# Patient Record
Sex: FEMALE | Race: WHITE
[De-identification: names, ages, dates, MRNs, and addresses within clinical notes are randomized per-mention and may not be internally consistent; named-entity substitution may affect disease eponyms.]

---

## 2018-04-06 ENCOUNTER — HOSPITAL ENCOUNTER (INPATIENT)
Dept: HOSPITAL 47 - EC | Age: 68
LOS: 2 days | Discharge: HOME | DRG: 310 | End: 2018-04-08
Attending: INTERNAL MEDICINE | Admitting: INTERNAL MEDICINE
Payer: MEDICARE

## 2018-04-06 VITALS — BODY MASS INDEX: 39.6 KG/M2

## 2018-04-06 DIAGNOSIS — E66.9: ICD-10-CM

## 2018-04-06 DIAGNOSIS — E11.9: ICD-10-CM

## 2018-04-06 DIAGNOSIS — Z79.899: ICD-10-CM

## 2018-04-06 DIAGNOSIS — I48.0: Primary | ICD-10-CM

## 2018-04-06 DIAGNOSIS — F41.9: ICD-10-CM

## 2018-04-06 DIAGNOSIS — Z79.01: ICD-10-CM

## 2018-04-06 DIAGNOSIS — Z91.040: ICD-10-CM

## 2018-04-06 DIAGNOSIS — I10: ICD-10-CM

## 2018-04-06 DIAGNOSIS — Z73.3: ICD-10-CM

## 2018-04-06 DIAGNOSIS — Z88.1: ICD-10-CM

## 2018-04-06 DIAGNOSIS — L30.4: ICD-10-CM

## 2018-04-06 DIAGNOSIS — Z87.01: ICD-10-CM

## 2018-04-06 DIAGNOSIS — Z90.89: ICD-10-CM

## 2018-04-06 DIAGNOSIS — I45.10: ICD-10-CM

## 2018-04-06 DIAGNOSIS — J45.909: ICD-10-CM

## 2018-04-06 DIAGNOSIS — R01.1: ICD-10-CM

## 2018-04-06 DIAGNOSIS — Z91.013: ICD-10-CM

## 2018-04-06 DIAGNOSIS — Z90.49: ICD-10-CM

## 2018-04-06 DIAGNOSIS — Z88.8: ICD-10-CM

## 2018-04-06 LAB
ALBUMIN SERPL-MCNC: 3.9 G/DL (ref 3.5–5)
ALP SERPL-CCNC: 81 U/L (ref 38–126)
ALT SERPL-CCNC: 36 U/L (ref 9–52)
ANION GAP SERPL CALC-SCNC: 11 MMOL/L
APTT BLD: 23.8 SEC (ref 22–30)
AST SERPL-CCNC: 25 U/L (ref 14–36)
BASOPHILS # BLD AUTO: 0 K/UL (ref 0–0.2)
BASOPHILS NFR BLD AUTO: 0 %
BUN SERPL-SCNC: 21 MG/DL (ref 7–17)
CALCIUM SPEC-MCNC: 10.1 MG/DL (ref 8.4–10.2)
CHLORIDE SERPL-SCNC: 104 MMOL/L (ref 98–107)
CK SERPL-CCNC: 36 U/L (ref 30–135)
CO2 SERPL-SCNC: 30 MMOL/L (ref 22–30)
EOSINOPHIL # BLD AUTO: 0.1 K/UL (ref 0–0.7)
EOSINOPHIL NFR BLD AUTO: 1 %
ERYTHROCYTE [DISTWIDTH] IN BLOOD BY AUTOMATED COUNT: 4.97 M/UL (ref 3.8–5.4)
ERYTHROCYTE [DISTWIDTH] IN BLOOD: 14.9 % (ref 11.5–15.5)
GLUCOSE SERPL-MCNC: 142 MG/DL (ref 74–99)
HCT VFR BLD AUTO: 44.2 % (ref 34–46)
HGB BLD-MCNC: 14.5 GM/DL (ref 11.4–16)
INR PPP: 1 (ref ?–1.2)
LYMPHOCYTES # SPEC AUTO: 1.7 K/UL (ref 1–4.8)
LYMPHOCYTES NFR SPEC AUTO: 15 %
MAGNESIUM SPEC-SCNC: 1.8 MG/DL (ref 1.6–2.3)
MCH RBC QN AUTO: 29.3 PG (ref 25–35)
MCHC RBC AUTO-ENTMCNC: 32.9 G/DL (ref 31–37)
MCV RBC AUTO: 88.9 FL (ref 80–100)
MONOCYTES # BLD AUTO: 0.6 K/UL (ref 0–1)
MONOCYTES NFR BLD AUTO: 5 %
NEUTROPHILS # BLD AUTO: 9.1 K/UL (ref 1.3–7.7)
NEUTROPHILS NFR BLD AUTO: 78 %
PLATELET # BLD AUTO: 276 K/UL (ref 150–450)
POTASSIUM SERPL-SCNC: 4.1 MMOL/L (ref 3.5–5.1)
PROT SERPL-MCNC: 6.7 G/DL (ref 6.3–8.2)
PT BLD: 9.7 SEC (ref 9–12)
SODIUM SERPL-SCNC: 145 MMOL/L (ref 137–145)
TROPONIN I SERPL-MCNC: 0.02 NG/ML (ref 0–0.03)
WBC # BLD AUTO: 11.7 K/UL (ref 3.8–10.6)

## 2018-04-06 PROCEDURE — 96365 THER/PROPH/DIAG IV INF INIT: CPT

## 2018-04-06 PROCEDURE — 83880 ASSAY OF NATRIURETIC PEPTIDE: CPT

## 2018-04-06 PROCEDURE — 94760 N-INVAS EAR/PLS OXIMETRY 1: CPT

## 2018-04-06 PROCEDURE — 85610 PROTHROMBIN TIME: CPT

## 2018-04-06 PROCEDURE — 94640 AIRWAY INHALATION TREATMENT: CPT

## 2018-04-06 PROCEDURE — 36415 COLL VENOUS BLD VENIPUNCTURE: CPT

## 2018-04-06 PROCEDURE — 80061 LIPID PANEL: CPT

## 2018-04-06 PROCEDURE — 82550 ASSAY OF CK (CPK): CPT

## 2018-04-06 PROCEDURE — 85730 THROMBOPLASTIN TIME PARTIAL: CPT

## 2018-04-06 PROCEDURE — 71046 X-RAY EXAM CHEST 2 VIEWS: CPT

## 2018-04-06 PROCEDURE — 84443 ASSAY THYROID STIM HORMONE: CPT

## 2018-04-06 PROCEDURE — 85025 COMPLETE CBC W/AUTO DIFF WBC: CPT

## 2018-04-06 PROCEDURE — 93005 ELECTROCARDIOGRAM TRACING: CPT

## 2018-04-06 PROCEDURE — 84484 ASSAY OF TROPONIN QUANT: CPT

## 2018-04-06 PROCEDURE — 96366 THER/PROPH/DIAG IV INF ADDON: CPT

## 2018-04-06 PROCEDURE — 82553 CREATINE MB FRACTION: CPT

## 2018-04-06 PROCEDURE — 80053 COMPREHEN METABOLIC PANEL: CPT

## 2018-04-06 PROCEDURE — 99291 CRITICAL CARE FIRST HOUR: CPT

## 2018-04-06 PROCEDURE — 83735 ASSAY OF MAGNESIUM: CPT

## 2018-04-06 RX ADMIN — DILTIAZEM HYDROCHLORIDE ONE MLS/HR: 5 INJECTION INTRAVENOUS at 22:34

## 2018-04-06 RX ADMIN — FAMOTIDINE SCH MG: 20 TABLET, FILM COATED ORAL at 21:01

## 2018-04-06 RX ADMIN — BUDESONIDE SCH MG: 0.5 INHALANT ORAL at 20:59

## 2018-04-06 RX ADMIN — NYSTATIN SCH APPLIC: 100000 OINTMENT TOPICAL at 22:27

## 2018-04-06 RX ADMIN — ASPIRIN SCH: 325 TABLET ORAL at 21:02

## 2018-04-06 RX ADMIN — MONTELUKAST SODIUM SCH MG: 10 TABLET, FILM COATED ORAL at 20:57

## 2018-04-06 RX ADMIN — ISODIUM CHLORIDE PRN MG: 0.03 SOLUTION RESPIRATORY (INHALATION) at 21:01

## 2018-04-06 RX ADMIN — PROPAFENONE HYDROCHLORIDE SCH MG: 225 TABLET, FILM COATED ORAL at 20:57

## 2018-04-06 RX ADMIN — TIMOLOL MALEATE SCH DROPS: 5 SOLUTION OPHTHALMIC at 20:56

## 2018-04-06 RX ADMIN — DILTIAZEM HYDROCHLORIDE ONE MLS/HR: 5 INJECTION INTRAVENOUS at 16:42

## 2018-04-06 RX ADMIN — BRIMONIDINE TARTRATE SCH DROPS: 2 SOLUTION/ DROPS OPHTHALMIC at 20:57

## 2018-04-06 RX ADMIN — APIXABAN SCH MG: 5 TABLET, FILM COATED ORAL at 20:57

## 2018-04-06 NOTE — P.HPIM
History of Present Illness





68-year-old female history of atrial fibrillation and history of asthma who 

states she had the onset around 2 PM today of some shortness of breath 

dizziness no palpitations she also had chest pain was 8/10 severity with 

radiation to her gums and left arm.  EMS was finally called she was given 

nitroglycerin with some resolution of the pain down to find a 6/10.  She has no 

recent cough or cold fevers chills sweats.  Patient is found to be in atrial 

fibrillation here patient was started on Cardizem drip and patient denied any 

history of congestive heart failure.  Patient only took half a dose of upper 

part known and she ran out of the medication today she is supposed to see Her 

cardiologist Dr. Mata as an outpatient today.  Patient is already on 

anticoagulation which is being continued percent of troponin is negative.  

Patient denied any fever chills dysuria nausea vomiting.  She attributes most 

of her symptoms to psychosocial stressors at home





Review of Systems


REVIEW OF SYSTEMS: 


CONSTITUTIONAL: No fever, no malaise, no fatigue. 


HEENT: No recent visual problems or hearing problems. Denied any sore throat. 


CARDIOVASCULAR: As mentioned in HPI


PULMONARY: No shortness of breath, no cough, no hemoptysis. 


GASTROINTESTINAL: No diarrhea, no nausea, no vomiting, no abdominal pain. 

Normoactive bowel sounds. 


NEUROLOGICAL: No headaches, no weakness, no numbness. 


HEMATOLOGICAL: Denies any bleeding or petechiae. 


GENITOURINARY: Denies any burning micturition, frequency, or urgency. 


MUSCULOSKELETAL/RHEUMATOLOGICAL: Denies any joint pain, swelling, or any muscle 

pain. 


ENDOCRINE: Denies any polyuria or polydipsia. 





The rest of the 14-point review of systems is negative.











Past Medical History


Past Medical History: Atrial Fibrillation, Hypertension, Pneumonia


History of Any Multi-Drug Resistant Organisms: None Reported


Past Surgical History: Adenoidectomy, Appendectomy, Heart Catheterization, 

Tonsillectomy


Past Psychological History: No Psychological Hx Reported


Smoking Status: Never smoker


Past Alcohol Use History: Occasional


Past Drug Use History: None Reported





Medications and Allergies


 Home Medications











 Medication  Instructions  Recorded  Confirmed  Type


 


ALPRAZolam [Xanax] 0.25 mg PO BID 04/06/18 04/06/18 History


 


Acyclovir 400 mg PO TID PRN 04/06/18 04/06/18 History


 


Albuterol Inhaler [Ventolin Hfa 2 puff INHALATION RT-Q4H PRN 04/06/18 04/06/18 

History





Inhaler]    


 


Albuterol Nebulized [Ventolin 2.5 mg INHALATION RT-TID PRN 04/06/18 04/06/18 

History





Nebulized]    


 


Apixaban [Eliquis] 5 mg PO BID 04/06/18 04/06/18 History


 


Atenolol [Tenormin] 12.5 mg PO DAILY 04/06/18 04/06/18 History


 


Brimonidine Tartrate/Timolol 1 drop BOTH EYES Q12H 04/06/18 04/06/18 History





[Combigan 0.2%-0.5% Eye Drops]    


 


Budesonide [Pulmicort] 0.5 mg INHALATION RT-BID 04/06/18 04/06/18 History


 


Docusate [Colace] 100 mg PO DAILY PRN 04/06/18 04/06/18 History


 


Famotidine [Pepcid] 20 mg PO BID 04/06/18 04/06/18 History


 


Fish Oil/Dha/Epa [Fish Oil 1,200 1 cap PO DAILY 04/06/18 04/06/18 History





mg Fish Oil]    


 


Hydrochlorothiazide 12.5 mg PO DAILY 04/06/18 04/06/18 History


 


Lisinopril 40 mg PO DAILY 04/06/18 04/06/18 History


 


Montelukast [Singulair] 10 mg PO HS 04/06/18 04/06/18 History


 


Nefazodone HCl 100 - 200 mg PO HS 04/06/18 04/06/18 History


 


Nystatin 100,000 Unit/gm Oint 1 applic TOPICAL BID 04/06/18 04/06/18 History





[Mycostatin Oint]    


 


Propafenone [Rythmol] 225 mg PO Q8H 04/06/18 04/06/18 History


 


Super B Complex Max 1 tab PO DAILY 04/06/18 04/06/18 History


 


Ubidecarenone [Co Q-10] 100 mg PO DAILY 04/06/18 04/06/18 History


 


Vits A,C,E/Lutein/Minerals 1 tab PO DAILY 04/06/18 04/06/18 History





[Ocuvite with Lutein Tablet]    


 


guaiFENesin [Mucinex] 600 mg PO BID 04/06/18 04/06/18 History











 Allergies











Allergy/AdvReac Type Severity Reaction Status Date / Time


 


azithromycin [From Zithromax] Allergy  Unknown Verified 04/06/18 17:05


 


ciprofloxacin Allergy  Unknown Verified 04/06/18 17:04


 


fluoxetine Allergy  Unknown Verified 04/06/18 17:05


 


latex Allergy  Dyspnea Verified 04/06/18 17:04


 


Latex, Natural Rubber Allergy  Dyspnea Verified 04/06/18 17:04


 


paroxetine [From Paxil] Allergy  Unknown Verified 04/06/18 17:05


 


shellfish derived [Shellfish] Allergy  Unknown Verified 04/06/18 17:05














Physical Exam


Vitals: 


 Vital Signs











  Temp Pulse Pulse Resp BP Pulse Ox


 


 04/06/18 19:33  97.8 F  75   18  106/74  100


 


 04/06/18 19:00   75   18  106/74  100


 


 04/06/18 18:57   82   18  101/53  97


 


 04/06/18 18:10   116 H   18  128/87  97


 


 04/06/18 17:42   136 H   18  140/60  98


 


 04/06/18 16:23    140 H   


 


 04/06/18 15:33  97.8 F  158 H   18  170/95  98








 Intake and Output











 04/06/18 04/06/18 04/06/18





 06:59 14:59 22:59


 


Other:   


 


  Weight   97.522 kg











PHYSICAL EXAMINATION: 





GENERAL: The patient is alert and oriented x3, not in any acute distress. Well 

developed, well nourished. 


HEENT: Pupils are round and equally reacting to light. EOMI. No scleral 

icterus. No conjunctival pallor. Normocephalic, atraumatic. No pharyngeal 

erythema. No thyromegaly. 


CARDIOVASCULAR: S1 and S2 present. No murmurs, rubs, or gallops.  Irregularly 

irregular rhythm.


PULMONARY: Chest is clear to auscultation, no wheezing or crackles. 


ABDOMEN: Soft, nontender, nondistended, normoactive bowel sounds. No palpable 

organomegaly. 


MUSCULOSKELETAL: No joint swelling or deformity.


EXTREMITIES: No cyanosis, clubbing, or pedal edema. 


NEUROLOGICAL: Gross neurological examination did not reveal any focal deficits. 


SKIN: No rashes. 











Results


CBC & Chem 7: 


 04/06/18 16:15





 04/06/18 16:15


Labs: 


 Abnormal Lab Results - Last 24 Hours (Table)











  04/06/18 04/06/18 04/06/18 Range/Units





  16:15 16:15 16:15 


 


WBC   11.7 H   (3.8-10.6)  k/uL


 


Neutrophils #   9.1 H   (1.3-7.7)  k/uL


 


BUN    21 H  (7-17)  mg/dL


 


Glucose    142 H  (74-99)  mg/dL


 


CK-MB (CK-2)  3.0 H*    (0.0-2.4)  ng/mL














Assessment and Plan


Plan: 


-Chest pain appears to be secondary to atrial fibrillation reveal a valid the 

patient 2 more sets of troponins will be obtained 


-atrial fibrillation with rapid and regular rate secondary to missing her 

medications.  Patient will be continued on Cardizem and off Cardizem and 

patient will be restarted back on her propafenone known and anti-correlation 

will be continued.  She appears to have had a recent echocardiogram at [facility] results of which will be obtained tomorrow.


-Hypertension


-Anxiety disorder


-Discussed esophageal reflux disease


-Asthma without any acute exacerbation presently

## 2018-04-06 NOTE — ED
Chest Pain HPI





- General


Chief Complaint: Chest Pain


Stated Complaint: Chest pain


Time Seen by Provider: 04/06/18 15:30


Source: patient, RN notes reviewed


Mode of arrival: EMS


Limitations: no limitations





- History of Present Illness


Initial Comments: 





This is a 68-year-old female history of atrial fibrillation and history of 

asthma who states she had the onset around 2 PM today of some shortness of 

breath dizziness no palpitations she also had chest pain was 8/10 severity with 

radiation to her gums and left arm.  EMS was finally called she was given 

nitroglycerin with some resolution of the pain down to find a 6/10.  She has no 

recent cough or cold fevers chills sweats.


MD Complaint: chest pain, other





- Related Data


 Home Medications











 Medication  Instructions  Recorded  Confirmed


 


ALPRAZolam [Xanax] 0.25 mg PO BID 04/06/18 04/06/18


 


Acyclovir 400 mg PO TID PRN 04/06/18 04/06/18


 


Albuterol Inhaler [Ventolin Hfa 2 puff INHALATION RT-Q4H PRN 04/06/18 04/06/18





Inhaler]   


 


Albuterol Nebulized [Ventolin 2.5 mg INHALATION RT-TID PRN 04/06/18 04/06/18





Nebulized]   


 


Apixaban [Eliquis] 5 mg PO BID 04/06/18 04/06/18


 


Atenolol [Tenormin] 12.5 mg PO DAILY 04/06/18 04/06/18


 


Brimonidine Tartrate/Timolol 1 drop BOTH EYES Q12H 04/06/18 04/06/18





[Combigan 0.2%-0.5% Eye Drops]   


 


Budesonide [Pulmicort] 0.5 mg INHALATION RT-BID 04/06/18 04/06/18


 


Docusate [Colace] 100 mg PO DAILY PRN 04/06/18 04/06/18


 


Famotidine [Pepcid] 20 mg PO BID 04/06/18 04/06/18


 


Fish Oil/Dha/Epa [Fish Oil 1,200 1 cap PO DAILY 04/06/18 04/06/18





mg Fish Oil]   


 


Hydrochlorothiazide 12.5 mg PO DAILY 04/06/18 04/06/18


 


Lisinopril 40 mg PO DAILY 04/06/18 04/06/18


 


Montelukast [Singulair] 10 mg PO HS 04/06/18 04/06/18


 


Nefazodone HCl 100 - 200 mg PO HS 04/06/18 04/06/18


 


Nystatin 100,000 Unit/gm Oint 1 applic TOPICAL BID 04/06/18 04/06/18





[Mycostatin Oint]   


 


Propafenone [Rythmol] 225 mg PO Q8H 04/06/18 04/06/18


 


Super B Complex Max 1 tab PO DAILY 04/06/18 04/06/18


 


Ubidecarenone [Co Q-10] 100 mg PO DAILY 04/06/18 04/06/18


 


Vits A,C,E/Lutein/Minerals 1 tab PO DAILY 04/06/18 04/06/18





[Ocuvite with Lutein Tablet]   


 


guaiFENesin [Mucinex] 600 mg PO BID 04/06/18 04/06/18











 Allergies











Allergy/AdvReac Type Severity Reaction Status Date / Time


 


azithromycin [From Zithromax] Allergy  Unknown Verified 04/06/18 17:05


 


ciprofloxacin Allergy  Unknown Verified 04/06/18 17:04


 


fluoxetine Allergy  Unknown Verified 04/06/18 17:05


 


latex Allergy  Dyspnea Verified 04/06/18 17:04


 


Latex, Natural Rubber Allergy  Dyspnea Verified 04/06/18 17:04


 


paroxetine [From Paxil] Allergy  Unknown Verified 04/06/18 17:05


 


shellfish derived [Shellfish] Allergy  Unknown Verified 04/06/18 17:05














Review of Systems


ROS Statement: 


Those systems with pertinent positive or pertinent negative responses have been 

documented in the HPI.





ROS Other: All systems not noted in ROS Statement are negative.





EKG Findings





- EKG Results:


EKG: interpreted by ER[facility] (Atrial fibrillation with a rapid ventricular response 

rate of 136 QT/QTC of 334/5 with 2 nonspecific interventricular conduction 

block nonspecific T-wave configuration.  This is changed from an EKG dated 03/17 /2013 which at that time showed a normal sinus rhythm.)





Past Medical History


Past Medical History: Atrial Fibrillation, Hypertension, Pneumonia


History of Any Multi-Drug Resistant Organisms: None Reported


Past Surgical History: Adenoidectomy, Appendectomy, Heart Catheterization, 

Tonsillectomy


Past Psychological History: No Psychological Hx Reported


Smoking Status: Never smoker


Past Alcohol Use History: Occasional


Past Drug Use History: None Reported





General Exam





- General Exam Comments


Initial Comments: 





This is a well-developed well-nourished awake alert oriented 3 female


Limitations: no limitations


General appearance: alert, anxious


Head exam: Present: atraumatic, normocephalic, normal inspection


Eye exam: Present: normal appearance, PERRL, EOMI.  Absent: scleral icterus, 

conjunctival injection, periorbital swelling


ENT exam: Present: normal exam, mucous membranes moist


Neck exam: Present: normal inspection.  Absent: tenderness, meningismus, 

lymphadenopathy


Respiratory exam: Present: normal lung sounds bilaterally.  Absent: respiratory 

distress, wheezes, rales, rhonchi, stridor


Cardiovascular Exam: Present: tachycardia, irregular rhythm.  Absent: systolic 

murmur, diastolic murmur, rubs, gallop, clicks


GI/Abdominal exam: Present: soft, normal bowel sounds.  Absent: distended, 

tenderness, guarding, rebound, rigid


Extremities exam: Present: normal inspection, full ROM, normal capillary 

refill.  Absent: tenderness, pedal edema, joint swelling, calf tenderness


Back exam: Present: normal inspection


Neurological exam: Present: alert, oriented X3, CN II-XII intact


Psychiatric exam: Present: normal affect, normal mood


Skin exam: Present: warm, dry, intact, normal color.  Absent: rash





Course


 Vital Signs











  04/06/18 04/06/18 04/06/18





  15:33 16:23 17:42


 


Temperature 97.8 F  


 


Pulse Rate 158 H  136 H


 


Pulse Rate [  140 H 





Cardiac Monitor   





]   


 


Respiratory 18  18





Rate   


 


Blood Pressure 170/95  140/60


 


O2 Sat by Pulse 98  98





Oximetry   














  04/06/18





  18:10


 


Temperature 


 


Pulse Rate 116 H


 


Pulse Rate [ 





Cardiac Monitor 





] 


 


Respiratory 18





Rate 


 


Blood Pressure 128/87


 


O2 Sat by Pulse 97





Oximetry 














- Reevaluation(s)


Reevaluation #1: 





04/06/18 18:12


Some improvement in the heart rate but is still vacillating between the 110s to 

140s no further chest pain at this time.





Chest Pain MDM





- MDM





Imaging shows no acute findings I discuss [employer] the patient she had atrial 

fibrillation with rapid ventricular response she'll be admitted with 

consultation by cardiology.  She does see Dr. Mata from cardiology





Critical Care Time


Critical Care Time: Yes


Critical Care Time: 





31 minutes critical care time which includes initial presentation with history 

physical labs x-rays reevaluation patient responsive therapy.  Discussion with 

the patient regarding findings discussed with the main physician admission 

orders and documentation of the above





Disposition


Clinical Impression: 


 Rapid atrial fibrillation, Atypical chest pain





Disposition: ADMITTED AS IP TO THIS HOSP


Condition: Stable


Referrals: 


None,Stated [Primary Care Provider] - 1-2 days

## 2018-04-06 NOTE — XR
EXAMINATION TYPE: XR chest 2V

 

DATE OF EXAM: 4/6/2018

 

COMPARISON: 3/17/2013

 

HISTORY: 68-year-old female with chest pain

 

TECHNIQUE:  AP and lateral views

 

FINDINGS:  

Heart upper limits of normal in size. Aorta within normal limits. Mild diffuse interstitial prominenc
e peribronchial cuffing. No consolidation or pleural effusion. Limitations due to portable technique 
and large patient body habitus.

 

 

IMPRESSION:  

Correlate for bronchitis or asthma. No focal infiltrate.

## 2018-04-07 LAB
ALBUMIN SERPL-MCNC: 3.2 G/DL (ref 3.5–5)
ALP SERPL-CCNC: 61 U/L (ref 38–126)
ALT SERPL-CCNC: 32 U/L (ref 9–52)
ANION GAP SERPL CALC-SCNC: 11 MMOL/L
AST SERPL-CCNC: 24 U/L (ref 14–36)
BUN SERPL-SCNC: 21 MG/DL (ref 7–17)
CALCIUM SPEC-MCNC: 9.5 MG/DL (ref 8.4–10.2)
CHLORIDE SERPL-SCNC: 106 MMOL/L (ref 98–107)
CHOLEST SERPL-MCNC: 160 MG/DL (ref ?–200)
CO2 SERPL-SCNC: 27 MMOL/L (ref 22–30)
GLUCOSE SERPL-MCNC: 122 MG/DL (ref 74–99)
HDLC SERPL-MCNC: 52 MG/DL (ref 40–60)
LDLC SERPL CALC-MCNC: 80 MG/DL (ref 0–99)
POTASSIUM SERPL-SCNC: 4.5 MMOL/L (ref 3.5–5.1)
PROT SERPL-MCNC: 5.7 G/DL (ref 6.3–8.2)
SODIUM SERPL-SCNC: 144 MMOL/L (ref 137–145)
TRIGL SERPL-MCNC: 141 MG/DL (ref ?–150)

## 2018-04-07 RX ADMIN — NYSTATIN SCH APPLIC: 100000 OINTMENT TOPICAL at 08:19

## 2018-04-07 RX ADMIN — BRIMONIDINE TARTRATE SCH DROPS: 2 SOLUTION/ DROPS OPHTHALMIC at 20:14

## 2018-04-07 RX ADMIN — PROPAFENONE HYDROCHLORIDE SCH MG: 225 TABLET, FILM COATED ORAL at 20:15

## 2018-04-07 RX ADMIN — NYSTATIN SCH APPLIC: 100000 OINTMENT TOPICAL at 20:25

## 2018-04-07 RX ADMIN — PROPAFENONE HYDROCHLORIDE SCH MG: 225 TABLET, FILM COATED ORAL at 04:22

## 2018-04-07 RX ADMIN — PROPAFENONE HYDROCHLORIDE SCH MG: 225 TABLET, FILM COATED ORAL at 12:49

## 2018-04-07 RX ADMIN — TIMOLOL MALEATE SCH DROPS: 5 SOLUTION OPHTHALMIC at 08:18

## 2018-04-07 RX ADMIN — ISODIUM CHLORIDE PRN MG: 0.03 SOLUTION RESPIRATORY (INHALATION) at 20:55

## 2018-04-07 RX ADMIN — LISINOPRIL SCH MG: 20 TABLET ORAL at 08:17

## 2018-04-07 RX ADMIN — Medication SCH EACH: at 12:49

## 2018-04-07 RX ADMIN — APIXABAN SCH MG: 5 TABLET, FILM COATED ORAL at 08:16

## 2018-04-07 RX ADMIN — MONTELUKAST SODIUM SCH MG: 10 TABLET, FILM COATED ORAL at 20:16

## 2018-04-07 RX ADMIN — I-VITE, TAB 1000-60-2MG (60/BT) SCH EACH: TAB at 12:49

## 2018-04-07 RX ADMIN — BUDESONIDE SCH MG: 0.5 INHALANT ORAL at 11:10

## 2018-04-07 RX ADMIN — APIXABAN SCH MG: 5 TABLET, FILM COATED ORAL at 20:15

## 2018-04-07 RX ADMIN — TIMOLOL MALEATE SCH DROPS: 5 SOLUTION OPHTHALMIC at 20:15

## 2018-04-07 RX ADMIN — BRIMONIDINE TARTRATE SCH DROPS: 2 SOLUTION/ DROPS OPHTHALMIC at 08:18

## 2018-04-07 RX ADMIN — ISODIUM CHLORIDE PRN MG: 0.03 SOLUTION RESPIRATORY (INHALATION) at 16:45

## 2018-04-07 RX ADMIN — BUDESONIDE SCH MG: 0.5 INHALANT ORAL at 20:52

## 2018-04-07 RX ADMIN — FAMOTIDINE SCH MG: 20 TABLET, FILM COATED ORAL at 08:16

## 2018-04-07 RX ADMIN — ASPIRIN SCH: 325 TABLET ORAL at 20:17

## 2018-04-07 RX ADMIN — FAMOTIDINE SCH MG: 20 TABLET, FILM COATED ORAL at 20:15

## 2018-04-07 RX ADMIN — ATENOLOL SCH MG: 25 TABLET ORAL at 08:17

## 2018-04-07 NOTE — P.CRDCN
History of Present Illness


Consult date: 04/07/18


Chief complaint: Chest discomfort


History of present illness: 





This is a pleasant 68-year-old  female patient who sees Dr. Mata as 

an outpatient on a regular basis with a past medical history significant for 

paroxysmal atrial fibrillation, obesity, and hypertension, presented to the 

hospital complaining of chest discomfort.  She just was discharged from the 

hospital last week after she was admitted with a pneumonia.  She was in her 

usual state of health when she took her updraft treatment for asthma and after 

that she felt the heart was racing up as well as she developed chest 

discomfort.  She does not recall which ones came in first.  She decided to come 

to the emergency room.  She states that she was running out of her medications 

for about 24 hours.  In the ER she was found to be in A. fib with RVR and 

subsequently converted to normal sinus mechanism.





The initial EKG showed A. fib with RVR and subsequent EKG showed sinus rhythm 

with RBBB and nonspecific changes.  The first set of cardiac enzyme came in to 

be slightly abnormal.





Currently the patient is in normal sinus mechanism and she is asymptomatic.  

She was restarted back on the beta blocker as well as Rythmol.  She is also on 

oral anticoagulation.





Past Medical History


Past Medical History: Atrial Fibrillation, Hypertension, Pneumonia


History of Any Multi-Drug Resistant Organisms: None Reported


Past Surgical History: Adenoidectomy, Appendectomy, Heart Catheterization, 

Tonsillectomy


Past Psychological History: No Psychological Hx Reported


Smoking Status: Never smoker


Past Alcohol Use History: Occasional


Past Drug Use History: None Reported





Medications and Allergies


 Home Medications











 Medication  Instructions  Recorded  Confirmed  Type


 


ALPRAZolam [Xanax] 0.25 mg PO BID 04/06/18 04/06/18 History


 


Acyclovir 400 mg PO TID PRN 04/06/18 04/06/18 History


 


Albuterol Inhaler [Ventolin Hfa 2 puff INHALATION RT-Q4H PRN 04/06/18 04/06/18 

History





Inhaler]    


 


Albuterol Nebulized [Ventolin 2.5 mg INHALATION RT-TID PRN 04/06/18 04/06/18 

History





Nebulized]    


 


Apixaban [Eliquis] 5 mg PO BID 04/06/18 04/06/18 History


 


Atenolol [Tenormin] 12.5 mg PO DAILY 04/06/18 04/06/18 History


 


Brimonidine Tartrate/Timolol 1 drop BOTH EYES Q12H 04/06/18 04/06/18 History





[Combigan 0.2%-0.5% Eye Drops]    


 


Budesonide [Pulmicort] 0.5 mg INHALATION RT-BID 04/06/18 04/06/18 History


 


Docusate [Colace] 100 mg PO DAILY PRN 04/06/18 04/06/18 History


 


Famotidine [Pepcid] 20 mg PO BID 04/06/18 04/06/18 History


 


Fish Oil/Dha/Epa [Fish Oil 1,200 1 cap PO DAILY 04/06/18 04/06/18 History





mg Fish Oil]    


 


Hydrochlorothiazide 12.5 mg PO DAILY 04/06/18 04/06/18 History


 


Lisinopril 40 mg PO DAILY 04/06/18 04/06/18 History


 


Montelukast [Singulair] 10 mg PO HS 04/06/18 04/06/18 History


 


Nefazodone HCl 100 - 200 mg PO HS 04/06/18 04/06/18 History


 


Nystatin 100,000 Unit/gm Oint 1 applic TOPICAL BID 04/06/18 04/06/18 History





[Mycostatin Oint]    


 


Propafenone [Rythmol] 225 mg PO Q8H 04/06/18 04/06/18 History


 


Super B Complex Max 1 tab PO DAILY 04/06/18 04/06/18 History


 


Ubidecarenone [Co Q-10] 100 mg PO DAILY 04/06/18 04/06/18 History


 


Vits A,C,E/Lutein/Minerals 1 tab PO DAILY 04/06/18 04/06/18 History





[Ocuvite with Lutein Tablet]    


 


guaiFENesin [Mucinex] 600 mg PO BID 04/06/18 04/06/18 History











 Allergies











Allergy/AdvReac Type Severity Reaction Status Date / Time


 


azithromycin [From Zithromax] Allergy  Unknown Verified 04/06/18 17:05


 


ciprofloxacin Allergy  Unknown Verified 04/06/18 17:04


 


fluoxetine Allergy  Unknown Verified 04/06/18 17:05


 


latex Allergy  Dyspnea Verified 04/06/18 17:04


 


Latex, Natural Rubber Allergy  Dyspnea Verified 04/06/18 17:04


 


paroxetine [From Paxil] Allergy  Unknown Verified 04/06/18 17:05


 


shellfish derived [Shellfish] Allergy  Unknown Verified 04/06/18 17:05














Physical Exam


Vitals: 


 Vital Signs











  Temp Pulse Pulse Resp BP BP Pulse Ox


 


 04/07/18 04:00  96.9 F L   60  18   105/50  98


 


 04/07/18 00:00  97.5 F L   69  20   105/74  99


 


 04/06/18 21:16   72     


 


 04/06/18 21:01   69     


 


 04/06/18 19:33  97.8 F  75   18  106/74   100


 


 04/06/18 19:00   75   18  106/74   100


 


 04/06/18 18:57   82   18  101/53   97


 


 04/06/18 18:45  96.2 F L   71  18   110/51  95


 


 04/06/18 18:10   116 H   18  128/87   97


 


 04/06/18 17:42   136 H   18  140/60   98


 


 04/06/18 16:23    140 H    


 


 04/06/18 15:33  97.8 F  158 H   18  170/95   98








 Intake and Output











 04/06/18 04/07/18 04/07/18





 22:59 06:59 14:59


 


Intake Total 29.333  


 


Balance 29.333  


 


Intake:   


 


  Intake, IV Titration 29.333  





  Amount   


 


    Diltiazem 50 mg In Sodium 29.333  





    Chloride 0.9% 40 ml @ 5   





    MG/HR 5 mls/hr IV .Q10H   





    ONE Rx#:[id number]   


 


Other:   


 


  # Voids 0 1 


 


  Weight 101.7 kg 101.7 kg 














- Constitutional


General appearance: no acute distress





- Respiratory


Respiratory: bilateral: CTA





- Cardiovascular


Rhythm: regular


Heart sounds: normal: S1, S2





Results





 04/06/18 16:15





 04/06/18 16:15


 Cardiac Enzymes











  04/06/18 04/06/18 04/06/18 Range/Units





  16:15 16:15 22:22 


 


AST   25   (14-36)  U/L


 


CK-MB (CK-2)  3.0 H*    (0.0-2.4)  ng/mL


 


Troponin I  0.020   0.269 H*  (0.000-0.034)  ng/mL














  04/07/18 Range/Units





  03:37 


 


AST   (14-36)  U/L


 


CK-MB (CK-2)   (0.0-2.4)  ng/mL


 


Troponin I  0.337 H*  (0.000-0.034)  ng/mL








 Coagulation











  04/06/18 Range/Units





  16:15 


 


PT  9.7  (9.0-12.0)  sec


 


APTT  23.8  (22.0-30.0)  sec








 CBC











  04/06/18 Range/Units





  16:15 


 


WBC  11.7 H  (3.8-10.6)  k/uL


 


RBC  4.97  (3.80-5.40)  m/uL


 


Hgb  14.5  (11.4-16.0)  gm/dL


 


Hct  44.2  (34.0-46.0)  %


 


Plt Count  276  (150-450)  k/uL








 Comprehensive Metabolic Panel











  04/06/18 Range/Units





  16:15 


 


Sodium  145  (137-145)  mmol/L


 


Potassium  4.1  (3.5-5.1)  mmol/L


 


Chloride  104  ()  mmol/L


 


Carbon Dioxide  30  (22-30)  mmol/L


 


BUN  21 H  (7-17)  mg/dL


 


Creatinine  0.80  (0.52-1.04)  mg/dL


 


Glucose  142 H  (74-99)  mg/dL


 


Calcium  10.1  (8.4-10.2)  mg/dL


 


AST  25  (14-36)  U/L


 


ALT  36  (9-52)  U/L


 


Alkaline Phosphatase  81  ()  U/L


 


Total Protein  6.7  (6.3-8.2)  g/dL


 


Albumin  3.9  (3.5-5.0)  g/dL








 Current Medications











Generic Name Dose Route Start Last Admin





  Trade Name Freq  PRN Reason Stop Dose Admin


 


Acyclovir  400 mg  04/06/18 18:18  04/07/18 01:37





  Zovirax  PO   400 mg





  TID PRN   Administration





  Skin Irritation   


 


Albuterol Sulfate  2.5 mg  04/06/18 18:18  04/06/18 21:01





  Ventolin Nebulized  INHALATION   2.5 mg





  RT-Q4H PRN   Administration





  Shortness Of Breath   


 


Albuterol Sulfate  2.5 mg  04/06/18 18:18  





  Ventolin Nebulized  INHALATION   





  RT-TID PRN   





  Shortness Of Breath   


 


Alprazolam  0.25 mg  04/06/18 21:00  04/07/18 08:13





  Xanax  PO   Not Given





  BID LENIN   


 


Apixaban  5 mg  04/06/18 21:00  04/07/18 08:16





  Eliquis  PO   5 mg





  BID LENIN   Administration


 


Atenolol  12.5 mg  04/07/18 09:00  04/07/18 08:17





  Tenormin  PO   12.5 mg





  DAILY LENIN   Administration


 


Brimonidine Tartrate  1 drops  04/06/18 21:00  04/07/18 08:18





  Alphagan P 0.2% Ophth Soln  BOTH EYES   1 drops





  Q12H LENIN   Administration


 


Budesonide  0.5 mg  04/06/18 20:00  04/06/18 20:59





  Pulmicort  INHALATION   0.5 mg





  RT-BID LENIN   Administration


 


Docusate Sodium  100 mg  04/06/18 18:18  





  Colace  PO   





  DAILY PRN   





  Constipation   


 


Famotidine  20 mg  04/06/18 21:00  04/07/18 08:16





  Pepcid  PO   20 mg





  BID [facility]   Administration


 


Guaifenesin  600 mg  04/06/18 21:00  04/07/18 08:16





  Mucinex  PO   600 mg





  BID [facility]   Administration


 


Lisinopril  20 mg  04/07/18 09:00  04/07/18 08:17





  Zestril  PO   20 mg





  DAILY [facility]   Administration


 


Montelukast Sodium  10 mg  04/06/18 21:00  04/06/18 20:57





  Singulair  PO   10 mg





  [facility]   Administration


 


Multivitamins/Minerals  1 each  04/07/18 12:00  





  Ivite  PO   





  DAILY@1200 [facility]   


 


Naloxone HCl  0.2 mg  04/06/18 18:16  





  Narcan  IV   





  Q2M PRN   





  Opioid Reversal   


 


Nefazodone Hcl 200  200 mg  04/06/18 21:00  04/06/18 21:02





  Mg  PO   Not Given





  [facility]   


 


Nystatin  1 applic  04/06/18 21:00  04/07/18 08:19





  Mycostatin Oint  TOPICAL   1 applic





  BID [facility]   Administration


 


Propafenone HCl  225 mg  04/06/18 20:00  04/07/18 04:22





  Rythmol  PO   225 mg





  Q8H [facility]   Administration


 


Timolol Maleate  1 drops  04/06/18 21:00  04/07/18 08:18





  Timoptic  BOTH EYES   1 drops





  Q12H [facility]   Administration


 


Vitamin B Complex/Vit C/Vit E/Zinc  1 each  04/07/18 12:00  





  Z-Bec  PO   





  DAILY@1200 LENIN   








 Intake and Output











 04/06/18 04/07/18 04/07/18





 22:59 06:59 14:59


 


Intake Total 29.333  


 


Balance 29.333  


 


Intake:   


 


  Intake, IV Titration 29.333  





  Amount   


 


    Diltiazem 50 mg In Sodium 29.333  





    Chloride 0.9% 40 ml @ 5   





    MG/HR 5 mls/hr IV .Q10H   





    ONE Rx#:[id number]   


 


Other:   


 


  # Voids 0 1 


 


  Weight 101.7 kg 101.7 kg 








 





 04/06/18 16:15 





 04/06/18 16:15 











Assessment and Plan


Assessment: 





Assessment


#1 A. fib with RVR and the patient converted to normal sinus mechanism


#2 known history of paroxysmal atrial fibrillation on antiarrhythmic medication


#3 mildly abnormal cardiac enzymes could be secondary to the A. fib with RVR.  

Severe underlying CAD to be ruled out


#4 hypertension


#5 obesity





Plan


#1 continue the current medical treatment which include the beta blocker as 

well as Rythmol as well as Eliquis.


#2 obtain serial cardiac enzymes to about any acute coronary event


#3 the patient has been maintaining normal sinus mechanism 


#4 follow-up on the echocardiogram


#5 if she developed any more chest discomfort she need to be ruled out for 

severe COPD


#6 follow-up with the patient.





Thank you for allowing us participate in her care

## 2018-04-07 NOTE — P.PN
Subjective


Patient is presently rate controlled and rhythm controlled as well considering 

her chest pain and elevated troponins cardiology is a monitoring 1 more day 

patient did admitted for atrial fibrillation with rapid ventricular rate with 

some chest pressure like sensation.  Patient has issues with psychosocial 

stressors and anxiety.





Constitutional: Denied any fatigue denied any fever.


Cardio vascular: denied any chest pain, palpitations


Gastrointestinal denied any nausea vomiting


Pulmonary: Denied any shortness of breath cough


Neurologic denied any new focal deficits








Objective





- Vital Signs


Vital signs: 


 Vital Signs











Temp  97.1 F L  04/07/18 08:00


 


Pulse  60   04/07/18 11:22


 


Resp  18   04/07/18 08:00


 


BP  120/60   04/07/18 08:00


 


Pulse Ox  100   04/07/18 08:00








 Intake & Output











 04/06/18 04/07/18 04/07/18





 18:59 06:59 18:59


 


Intake Total  29.333 240


 


Balance  29.333 240


 


Weight 101.7 kg 101.7 kg 


 


Intake:   


 


  Intake, IV Titration  29.333 





  Amount   


 


    Diltiazem 50 mg In Sodium  29.333 





    Chloride 0.9% 40 ml @ 5   





    MG/HR 5 mls/hr IV .Q10H   





    ONE Rx#:[id number]   


 


  Oral   240


 


Other:   


 


  # Voids  1 














- Exam


PHYSICAL EXAMINATION: 





GENERAL: The patient is alert and oriented x3, not in any acute distress. Well 

developed, well nourished. 


HEENT: Pupils are round and equally reacting to light. EOMI. No scleral 

icterus. No conjunctival pallor. Normocephalic, atraumatic. No pharyngeal 

erythema. No thyromegaly. 


CARDIOVASCULAR: S1 and S2 present. No murmurs, rubs, or gallops. 


PULMONARY: Chest is clear to auscultation, no wheezing or crackles. 


ABDOMEN: Soft, nontender, nondistended, normoactive bowel sounds. No palpable 

organomegaly. 


MUSCULOSKELETAL: No joint swelling or deformity.


EXTREMITIES: No cyanosis, clubbing, or pedal edema. 


NEUROLOGICAL: Gross neurological examination did not reveal any focal deficits. 


SKIN: No rashes. 











- Labs


CBC & Chem 7: 


 04/06/18 16:15





 04/07/18 09:43


Labs: 


 Abnormal Lab Results - Last 24 Hours (Table)











  04/06/18 04/06/18 04/06/18 Range/Units





  16:15 16:15 16:15 


 


WBC   11.7 H   (3.8-10.6)  k/uL


 


Neutrophils #   9.1 H   (1.3-7.7)  k/uL


 


BUN    21 H  (7-17)  mg/dL


 


Glucose    142 H  (74-99)  mg/dL


 


CK-MB (CK-2)  3.0 H*    (0.0-2.4)  ng/mL


 


Troponin I     (0.000-0.034)  ng/mL


 


Total Protein     (6.3-8.2)  g/dL


 


Albumin     (3.5-5.0)  g/dL














  04/06/18 04/07/18 04/07/18 Range/Units





  22:22 03:37 09:43 


 


WBC     (3.8-10.6)  k/uL


 


Neutrophils #     (1.3-7.7)  k/uL


 


BUN     (7-17)  mg/dL


 


Glucose     (74-99)  mg/dL


 


CK-MB (CK-2)     (0.0-2.4)  ng/mL


 


Troponin I  0.269 H*  0.337 H*  0.183 H*  (0.000-0.034)  ng/mL


 


Total Protein     (6.3-8.2)  g/dL


 


Albumin     (3.5-5.0)  g/dL














  04/07/18 Range/Units





  09:43 


 


WBC   (3.8-10.6)  k/uL


 


Neutrophils #   (1.3-7.7)  k/uL


 


BUN  21 H  (7-17)  mg/dL


 


Glucose  122 H  (74-99)  mg/dL


 


CK-MB (CK-2)   (0.0-2.4)  ng/mL


 


Troponin I   (0.000-0.034)  ng/mL


 


Total Protein  5.7 L  (6.3-8.2)  g/dL


 


Albumin  3.2 L  (3.5-5.0)  g/dL














Assessment and Plan


Plan: 


-Chest pain appears to be secondary to atrial fibrillation presently rate and 

rhythm controlled can you present medications Cardizem was discontinued 

discussed with cardiology.  Monitor 1 more night because of elevated troponins 

and chest pain.  Non-ST elevation microinfarction cannot be ruled out completely


-atrial fibrillation with rapid and regular rate secondary to missing her 

medications.  


-Hypertension


-Anxiety disorder


-Discussed esophageal reflux disease


-Asthma without any acute exacerbation presently

## 2018-04-08 VITALS — DIASTOLIC BLOOD PRESSURE: 70 MMHG | TEMPERATURE: 97.2 F | SYSTOLIC BLOOD PRESSURE: 119 MMHG | HEART RATE: 60 BPM

## 2018-04-08 VITALS — RESPIRATION RATE: 16 BRPM

## 2018-04-08 RX ADMIN — BUDESONIDE SCH MG: 0.5 INHALANT ORAL at 08:51

## 2018-04-08 RX ADMIN — LISINOPRIL SCH MG: 20 TABLET ORAL at 09:24

## 2018-04-08 RX ADMIN — PROPAFENONE HYDROCHLORIDE SCH MG: 225 TABLET, FILM COATED ORAL at 06:00

## 2018-04-08 RX ADMIN — ISODIUM CHLORIDE PRN MG: 0.03 SOLUTION RESPIRATORY (INHALATION) at 08:51

## 2018-04-08 RX ADMIN — TIMOLOL MALEATE SCH DROPS: 5 SOLUTION OPHTHALMIC at 09:25

## 2018-04-08 RX ADMIN — ATENOLOL SCH MG: 25 TABLET ORAL at 09:26

## 2018-04-08 RX ADMIN — Medication SCH EACH: at 11:37

## 2018-04-08 RX ADMIN — FAMOTIDINE SCH MG: 20 TABLET, FILM COATED ORAL at 09:25

## 2018-04-08 RX ADMIN — PROPAFENONE HYDROCHLORIDE SCH MG: 225 TABLET, FILM COATED ORAL at 11:37

## 2018-04-08 RX ADMIN — BRIMONIDINE TARTRATE SCH DROPS: 2 SOLUTION/ DROPS OPHTHALMIC at 09:25

## 2018-04-08 RX ADMIN — I-VITE, TAB 1000-60-2MG (60/BT) SCH EACH: TAB at 11:37

## 2018-04-08 RX ADMIN — APIXABAN SCH MG: 5 TABLET, FILM COATED ORAL at 09:25

## 2018-04-08 RX ADMIN — NYSTATIN SCH APPLIC: 100000 OINTMENT TOPICAL at 09:25

## 2018-04-08 NOTE — P.DS
Providers


Date of admission: 


04/06/18 18:16





Attending physician: 


Erasto Howard





Consults: 





 





04/06/18 18:17


Consult Physician Routine 


   Consulting Provider: Mukul Mata


   Consult Reason/Comments: Rapid atrial fibrillation, chest pain


   Do you want consulting provider notified?: Yes











Primary care physician: 


Stated None





Hospital Course: 


Patient is presently rate controlled and rhythm controlled as well considering 

her chest pain and elevated troponins cardiology is a monitoring 1 more day 

patient did admitted for atrial fibrillation with rapid ventricular rate with 

some chest pressure like sensation.  Patient has issues with psychosocial 

stressors and anxiety.





04/08/2018


Patient is presently rate and rhythm controlled patient will be discharged 

today patient has intertrigo under abdominal skin folds patient will be 

discharged on nystatin powder an prescription for nystatin powder will be 

provided and patient need to keep that area dry





PHYSICAL EXAMINATION: 





GENERAL: The patient is alert and oriented x3, not in any acute distress. Well 

developed, well nourished. 


HEENT: Pupils are round and equally reacting to light. EOMI. No scleral 

icterus. No conjunctival pallor. Normocephalic, atraumatic. No pharyngeal 

erythema. No thyromegaly. 


CARDIOVASCULAR: S1 and S2 present. No murmurs, rubs, or gallops. 


PULMONARY: Chest is clear to auscultation, no wheezing or crackles. 


ABDOMEN: Soft, nontender, nondistended, normoactive bowel sounds. No palpable 

organomegaly. 


MUSCULOSKELETAL: No joint swelling or deformity.


EXTREMITIES: No cyanosis, clubbing, or pedal edema. 


NEUROLOGICAL: Gross neurological examination did not reveal any focal deficits. 


SKIN: Redness under abdominal skin folds because of intertrigo and fungal 

infection





Assessment and Plan


Plan: 


-Chest pain appears to be secondary to atrial fibrillation presently rate and 

rhythm controlled can you present medications patient was a valid by cardiology 

the cleared her for discharge patient will follow Dr. Mata as an outpatient


-atrial fibrillation with rapid and regular rate secondary to missing her 

medications.  


-Hypertension


-Anxiety disorder


-Discussed esophageal reflux disease


-Asthma without any acute exacerbation presently





Patient Condition at Discharge: Stable





Plan - Discharge Summary


Discharge Rx Participant: No


New Discharge Prescriptions: 


New


   Nystatin 100,000 Unit/gm Powd [Mycostatin Powder] 1 applic TOPICAL TID #30 

day





Continue


   Nefazodone HCl 100 - 200 mg PO HS


   Fish Oil/Dha/Epa [Fish Oil 1,200 mg Fish Oil] 1 cap PO DAILY


   guaiFENesin [Mucinex] 600 mg PO BID


   Vits A,C,E/Lutein/Minerals [Ocuvite with Lutein Tablet] 1 tab PO DAILY


   Montelukast [Singulair] 10 mg PO HS


   Ubidecarenone [Co Q-10] 100 mg PO DAILY


   Famotidine [Pepcid] 20 mg PO BID


   Docusate [Colace] 100 mg PO DAILY PRN


     PRN Reason: Constipation


   Acyclovir 400 mg PO TID PRN


     PRN Reason: Skin Irritation


   Super B Complex Max 1 tab PO DAILY


   Budesonide [Pulmicort] 0.5 mg INHALATION RT-BID


   Brimonidine Tartrate/Timolol [Combigan 0.2%-0.5% Eye Drops] 1 drop BOTH EYES 

Q12H


   Atenolol [Tenormin] 12.5 mg PO DAILY


   Apixaban [Eliquis] 5 mg PO BID


   ALPRAZolam [Xanax] 0.25 mg PO BID


   Albuterol Nebulized [Ventolin Nebulized] 2.5 mg INHALATION RT-TID PRN


     PRN Reason: Shortness Of Breath


   Albuterol Inhaler [Ventolin Hfa Inhaler] 2 puff INHALATION RT-Q4H PRN


     PRN Reason: Shortness Of Breath


   Propafenone [Rythmol] 225 mg PO Q8H #90 tab





Changed


   Lisinopril 20 mg PO DAILY #0





Discontinued


   Nystatin 100,000 Unit/gm Oint [Mycostatin Oint] 1 applic TOPICAL BID


   Hydrochlorothiazide 12.5 mg PO DAILY


Discharge Medication List





ALPRAZolam [Xanax] 0.25 mg PO BID 04/06/18 [History]


Acyclovir 400 mg PO TID PRN 04/06/18 [History]


Albuterol Inhaler [Ventolin Hfa Inhaler] 2 puff INHALATION RT-Q4H PRN 04/06/18 [

History]


Albuterol Nebulized [Ventolin Nebulized] 2.5 mg INHALATION RT-TID PRN 04/06/18 [

History]


Apixaban [Eliquis] 5 mg PO BID 04/06/18 [History]


Atenolol [Tenormin] 12.5 mg PO DAILY 04/06/18 [History]


Brimonidine Tartrate/Timolol [Combigan 0.2%-0.5% Eye Drops] 1 drop BOTH EYES 

Q12H 04/06/18 [History]


Budesonide [Pulmicort] 0.5 mg INHALATION RT-BID 04/06/18 [History]


Docusate [Colace] 100 mg PO DAILY PRN 04/06/18 [History]


Famotidine [Pepcid] 20 mg PO BID 04/06/18 [History]


Fish Oil/Dha/Epa [Fish Oil 1,200 mg Fish Oil] 1 cap PO DAILY 04/06/18 [History]


Montelukast [Singulair] 10 mg PO HS 04/06/18 [History]


Nefazodone HCl 100 - 200 mg PO HS 04/06/18 [History]


Super B Complex Max 1 tab PO DAILY 04/06/18 [History]


Ubidecarenone [Co Q-10] 100 mg PO DAILY 04/06/18 [History]


Vits A,C,E/Lutein/Minerals [Ocuvite with Lutein Tablet] 1 tab PO DAILY 04/06/18 

[History]


guaiFENesin [Mucinex] 600 mg PO BID 04/06/18 [History]


Lisinopril 20 mg PO DAILY #0 04/08/18 [Rx]


Nystatin 100,000 Unit/gm Powd [Mycostatin Powder] 1 applic TOPICAL TID #30 day 

04/08/18 [Rx]


Propafenone [Rythmol] 225 mg PO Q8H #90 tab 04/08/18 [Rx]








Follow up Appointment(s)/Referral(s): 


Yulissa Owens MD [STAFF PHYSICIAN] - 1 Week


Mukul Mata MD [STAFF PHYSICIAN] - 1 Week


None,Stated [Primary Care Provider] - 1-2 days


Patient Instructions/Handouts:  A-fib (Atrial Fibrillation) (DC), Chest Pain (DC

)


Discharge Disposition: HOME SELF-CARE

## 2018-04-08 NOTE — P.PN
Subjective


Progress Note Date: 04/08/18


This is a pleasant 68-year-old  female who follows Dr. Mata in the 

office.  She has history of paroxysmal atrial fibrillation, obesity, 

hypertension, came to the hospital with symptoms of chest discomfort.  Patient 

was recently treated for pneumonia.  Patient was found to be in atrial 

fibrillation with rapid ventricular response on presentation here and 

subsequently converted to normal sinus rhythm.  Troponins came back mildly 

abnormal at 0.02, 0.26, 0.33, 0.18.  Echocardiogram with Doppler study has been 

requested but is yet pending.  This morning she remains in a normal sinus rhythm

, hemodynamically stable.








Objective





- Vital Signs


Vital signs: 


 Vital Signs











Temp  97.3 F L  04/08/18 08:00


 


Pulse  71   04/08/18 09:07


 


Resp  16   04/08/18 09:07


 


BP  125/63   04/08/18 08:00


 


Pulse Ox  95   04/08/18 08:51








 Intake & Output











 04/07/18 04/08/18 04/08/18





 18:59 06:59 18:59


 


Intake Total 600 0 240


 


Balance 600 0 240


 


Weight  101.3 kg 


 


Intake:   


 


  IV  0 0


 


    Invasive Line 1  0 0


 


  Oral 600  240


 


Other:   


 


  Voiding Method  Toilet Toilet


 


  # Voids 1 1 1














- Exam





PHYSICAL EXAMINATION: 





HEENT: Head is atraumatic, normocephalic.  Pupils equal, round.  Neck is 

supple.  There is no elevated jugular venous pressure.





HEART EXAMINATION: Heart S1 and S2 systolic murmur heard





CHEST EXAMINATION: Lungs are clear to auscultation and precussion. No chest 

wall tenderness is noted on palpation or with deep breathing.





ABDOMEN:  Soft, nontender. Bowel sounds are heard. No organomegaly noted.


 


EXTREMITIES: 2+ peripheral pulses with no evidence of peripheral edema and no 

calf tenderness noted.





NEUROLOGIC patient is awake, alert and oriented -3.


 


.


 








- Labs


CBC & Chem 7: 


 04/06/18 16:15





 04/07/18 09:43





Assessment and Plan


Plan: 


Assessment and plan


#1 paroxysmal atrial fibrillation


#2 mildly abnormal cardiac enzymes, could be secondary to A. fib with RVR, 

severe underlying coronary artery disease needs to be ruled out.


#3 hypertension


#4 diabetes





Plan


We will review the echocardiogram with Doppler study, further recommendations 

then will be made.





DNP note has been reviewed, I agree with a documented findings and plan of 

care.  Patient was seen and examined.

## 2020-06-12 ENCOUNTER — HOSPITAL ENCOUNTER (OUTPATIENT)
Dept: HOSPITAL 47 - LABPAT | Age: 70
Discharge: HOME | End: 2020-06-12
Attending: OBSTETRICS & GYNECOLOGY
Payer: MEDICARE

## 2020-06-12 DIAGNOSIS — N84.0: ICD-10-CM

## 2020-06-12 DIAGNOSIS — Z01.818: Primary | ICD-10-CM

## 2020-06-12 DIAGNOSIS — I10: ICD-10-CM

## 2020-06-12 LAB
ERYTHROCYTE [DISTWIDTH] IN BLOOD BY AUTOMATED COUNT: 4.84 M/UL (ref 3.8–5.4)
ERYTHROCYTE [DISTWIDTH] IN BLOOD: 13.3 % (ref 11.5–15.5)
HCT VFR BLD AUTO: 43.9 % (ref 34–46)
HGB BLD-MCNC: 14 GM/DL (ref 11.4–16)
MCH RBC QN AUTO: 28.9 PG (ref 25–35)
MCHC RBC AUTO-ENTMCNC: 31.9 G/DL (ref 31–37)
MCV RBC AUTO: 90.7 FL (ref 80–100)
PLATELET # BLD AUTO: 225 K/UL (ref 150–450)
WBC # BLD AUTO: 5.2 K/UL (ref 3.8–10.6)

## 2020-06-12 PROCEDURE — 93005 ELECTROCARDIOGRAM TRACING: CPT

## 2020-06-12 PROCEDURE — 85027 COMPLETE CBC AUTOMATED: CPT

## 2020-06-12 PROCEDURE — 36415 COLL VENOUS BLD VENIPUNCTURE: CPT

## 2020-06-15 ENCOUNTER — HOSPITAL ENCOUNTER (OUTPATIENT)
Dept: HOSPITAL 47 - OR | Age: 70
Discharge: HOME | End: 2020-06-15
Attending: OBSTETRICS & GYNECOLOGY
Payer: MEDICARE

## 2020-06-15 VITALS — RESPIRATION RATE: 16 BRPM

## 2020-06-15 VITALS — SYSTOLIC BLOOD PRESSURE: 117 MMHG | HEART RATE: 50 BPM | DIASTOLIC BLOOD PRESSURE: 75 MMHG

## 2020-06-15 VITALS — BODY MASS INDEX: 38.9 KG/M2

## 2020-06-15 VITALS — TEMPERATURE: 96.7 F

## 2020-06-15 DIAGNOSIS — Z91.013: ICD-10-CM

## 2020-06-15 DIAGNOSIS — R01.1: ICD-10-CM

## 2020-06-15 DIAGNOSIS — J45.909: ICD-10-CM

## 2020-06-15 DIAGNOSIS — Z85.828: ICD-10-CM

## 2020-06-15 DIAGNOSIS — Z86.59: ICD-10-CM

## 2020-06-15 DIAGNOSIS — K21.9: ICD-10-CM

## 2020-06-15 DIAGNOSIS — N84.0: Primary | ICD-10-CM

## 2020-06-15 DIAGNOSIS — F32.9: ICD-10-CM

## 2020-06-15 DIAGNOSIS — Z91.048: ICD-10-CM

## 2020-06-15 DIAGNOSIS — I20.9: ICD-10-CM

## 2020-06-15 DIAGNOSIS — E78.5: ICD-10-CM

## 2020-06-15 DIAGNOSIS — Z82.49: ICD-10-CM

## 2020-06-15 DIAGNOSIS — Z83.6: ICD-10-CM

## 2020-06-15 DIAGNOSIS — I48.91: ICD-10-CM

## 2020-06-15 DIAGNOSIS — I10: ICD-10-CM

## 2020-06-15 DIAGNOSIS — E66.01: ICD-10-CM

## 2020-06-15 DIAGNOSIS — Z88.1: ICD-10-CM

## 2020-06-15 DIAGNOSIS — Z80.9: ICD-10-CM

## 2020-06-15 DIAGNOSIS — Z91.040: ICD-10-CM

## 2020-06-15 DIAGNOSIS — E11.9: ICD-10-CM

## 2020-06-15 DIAGNOSIS — Z91.09: ICD-10-CM

## 2020-06-15 DIAGNOSIS — Z88.8: ICD-10-CM

## 2020-06-15 DIAGNOSIS — Z79.899: ICD-10-CM

## 2020-06-15 DIAGNOSIS — Z87.891: ICD-10-CM

## 2020-06-15 DIAGNOSIS — Z79.01: ICD-10-CM

## 2020-06-15 DIAGNOSIS — M19.90: ICD-10-CM

## 2020-06-15 DIAGNOSIS — K44.9: ICD-10-CM

## 2020-06-15 DIAGNOSIS — Z79.52: ICD-10-CM

## 2020-06-15 DIAGNOSIS — F41.9: ICD-10-CM

## 2020-06-15 DIAGNOSIS — Z98.890: ICD-10-CM

## 2020-06-15 LAB — GLUCOSE BLD-MCNC: 109 MG/DL (ref 75–99)

## 2020-06-15 PROCEDURE — 88342 IMHCHEM/IMCYTCHM 1ST ANTB: CPT

## 2020-06-15 PROCEDURE — 88305 TISSUE EXAM BY PATHOLOGIST: CPT

## 2020-06-15 PROCEDURE — 58558 HYSTEROSCOPY BIOPSY: CPT

## 2020-06-15 RX ADMIN — HYDROMORPHONE HYDROCHLORIDE PRN MG: 1 INJECTION, SOLUTION INTRAMUSCULAR; INTRAVENOUS; SUBCUTANEOUS at 11:36

## 2020-06-15 RX ADMIN — HYDROMORPHONE HYDROCHLORIDE PRN MG: 1 INJECTION, SOLUTION INTRAMUSCULAR; INTRAVENOUS; SUBCUTANEOUS at 11:47

## 2020-06-15 NOTE — P.OP
Date of Procedure: 06/15/20


Preoperative Diagnosis: 


Postmenopausal bleeding


Postoperative Diagnosis: 


Multiple endometrial polyps


Procedure(s) Performed: 


Hysteroscopy, fractional D&C, polypectomy


Anesthesia: ZOA


Surgeon: Katie Shrestha


Estimated Blood Loss (ml): 25


IV fluids (ml): 300


Urine output (ml): 150


Pathology: other (Endocervical curettings, endometrial curettings and multiple 

endometrial polyps)


Condition: stable


Disposition: PACU


Operative Findings: 


Multiple large endometrial polyps


Description of Procedure: 


Patient is brought to the operating suite where a general anesthetic is 

administered without difficulty.  She's placed in the dorsal lithotomy position.

 The appropriate timeout is performed to assure proper patient and procedural 

identification.  Cervix, vagina, and perineal bodies are all prepped and draped 

in the usual sterile fashion using non-iodine materials.  Examination under 

anesthesia reveals a small anteverted anteflexed uterus, negative adnexa 

bilaterally.  The bladder is drained for approximately 150 mL of clear yellow 

urine.  Weighted speculum was placed into the vagina.  Anterior lip of the 

cervix is grasped with an Allis clamp.





A curet is used 2 gently curettage the endocervical cavity.  The uterus then 

sounds to a depth of 8 cm in the anteverted position.  The cervix is gently and 

systematically dilated using Hanks dilators.  Hysteroscope was placed and the 

cavity is distended using sterile saline.  Hysteroscope was placed and the 

cavity is noted to contain multiple endometrial polyps.  Hysteroscope was 

removed.  A small sharp curette is then used to thoroughly and systematically 

curettage the endometrial cavity for at least 5-6 endometrial polyps.  

Hysteroscope was replaced, cavity is noted to be clear.  Polyp forcep is used as

well to assure that all tissue is removed.





The Allis clamp is removed.  All sponge needle and enhancement counts are 

correct.  Patient is brought back to the recovery room in very good condition 

with a blood pressure of 160/82, pulse 67, 98% O2 saturation, respiratory rate 

16.  She is given Toradol prior to leaving the operative suite.  She will 

follow-up with me in the office in 2 weeks.

## 2021-07-27 ENCOUNTER — HOSPITAL ENCOUNTER (OUTPATIENT)
Dept: HOSPITAL 47 - RADXRMAIN | Age: 71
Discharge: HOME | End: 2021-07-27
Attending: NURSE PRACTITIONER
Payer: MEDICARE

## 2021-07-27 DIAGNOSIS — M16.11: Primary | ICD-10-CM

## 2021-07-27 PROCEDURE — 73502 X-RAY EXAM HIP UNI 2-3 VIEWS: CPT

## 2021-07-27 NOTE — XR
EXAMINATION TYPE: XR Hip Complete RT

 

DATE OF EXAM: 7/27/2021

 

COMPARISON: None

 

HISTORY: Pain

 

TECHNIQUE: 2 view right hip

 

FINDINGS: There is narrowing of the joint space. Femoral head articulates with the acetabulum. No acu
te fractures or dislocations are evident.

 

IMPRESSION:

1.  Mild degenerative joint changes right hip.

## 2021-12-29 ENCOUNTER — HOSPITAL ENCOUNTER (OUTPATIENT)
Dept: HOSPITAL 47 - LABWHC1 | Age: 71
Discharge: HOME | End: 2021-12-29
Attending: NURSE PRACTITIONER
Payer: MEDICARE

## 2021-12-29 DIAGNOSIS — Z20.822: ICD-10-CM

## 2021-12-29 DIAGNOSIS — Z01.812: Primary | ICD-10-CM

## 2021-12-29 DIAGNOSIS — I48.0: ICD-10-CM

## 2021-12-29 LAB
APTT BLD: 25.7 SEC (ref 22–30)
INR PPP: 0.9 (ref ?–1.2)
PT BLD: 10 SEC (ref 9–12)

## 2021-12-29 PROCEDURE — 85025 COMPLETE CBC W/AUTO DIFF WBC: CPT

## 2021-12-29 PROCEDURE — 80053 COMPREHEN METABOLIC PANEL: CPT

## 2021-12-29 PROCEDURE — 36415 COLL VENOUS BLD VENIPUNCTURE: CPT

## 2021-12-29 PROCEDURE — 85610 PROTHROMBIN TIME: CPT

## 2021-12-29 PROCEDURE — 85730 THROMBOPLASTIN TIME PARTIAL: CPT

## 2021-12-30 LAB
ALBUMIN SERPL-MCNC: 4.1 G/DL (ref 3.8–4.9)
ALBUMIN/GLOB SERPL: 1.64 G/DL (ref 1.6–3.17)
ALP SERPL-CCNC: 124 U/L (ref 41–126)
ALT SERPL-CCNC: 17 U/L (ref 8–44)
ANION GAP SERPL CALC-SCNC: 14.7 MMOL/L (ref 10–18)
AST SERPL-CCNC: 13 U/L (ref 13–35)
BASOPHILS # BLD AUTO: 0.04 X 10*3/UL (ref 0–0.1)
BASOPHILS NFR BLD AUTO: 0.5 %
BUN SERPL-SCNC: 20.7 MG/DL (ref 9–27)
BUN/CREAT SERPL: 23 RATIO (ref 12–20)
CALCIUM SPEC-MCNC: 9.5 MG/DL (ref 8.7–10.3)
CHLORIDE SERPL-SCNC: 104 MMOL/L (ref 96–109)
CO2 SERPL-SCNC: 23.3 MMOL/L (ref 20–27.5)
EOSINOPHIL # BLD AUTO: 0.12 X 10*3/UL (ref 0.04–0.35)
EOSINOPHIL NFR BLD AUTO: 1.4 %
ERYTHROCYTE [DISTWIDTH] IN BLOOD BY AUTOMATED COUNT: 5.1 X 10*6/UL (ref 4.1–5.2)
ERYTHROCYTE [DISTWIDTH] IN BLOOD: 14.4 % (ref 11.5–14.5)
GLOBULIN SER CALC-MCNC: 2.5 G/DL (ref 1.6–3.3)
GLUCOSE SERPL-MCNC: 140 MG/DL (ref 70–110)
HCT VFR BLD AUTO: 45.8 % (ref 37.2–46.3)
HGB BLD-MCNC: 14.2 G/DL (ref 12–15)
LYMPHOCYTES # SPEC AUTO: 2.41 X 10*3/UL (ref 0.9–5)
LYMPHOCYTES NFR SPEC AUTO: 27.6 %
MCH RBC QN AUTO: 27.8 PG (ref 27–32)
MCHC RBC AUTO-ENTMCNC: 31 G/DL (ref 32–37)
MCV RBC AUTO: 89.8 FL (ref 80–97)
MONOCYTES # BLD AUTO: 0.81 X 10*3/UL (ref 0.2–1)
MONOCYTES NFR BLD AUTO: 9.3 %
NEUTROPHILS # BLD AUTO: 5.32 X 10*3/UL (ref 1.8–7.7)
NEUTROPHILS NFR BLD AUTO: 61 %
PLATELET # BLD AUTO: 278 X 10*3/UL (ref 140–440)
POTASSIUM SERPL-SCNC: 3.6 MMOL/L (ref 3.5–5.5)
PROT SERPL-MCNC: 6.6 G/DL (ref 6.2–8.2)
SODIUM SERPL-SCNC: 142 MMOL/L (ref 135–145)
WBC # BLD AUTO: 8.72 X 10*3/UL (ref 4.5–10)

## 2023-04-25 ENCOUNTER — HOSPITAL ENCOUNTER (OUTPATIENT)
Dept: HOSPITAL 47 - RADMRIMAIN | Age: 73
Discharge: HOME | End: 2023-04-25
Attending: FAMILY MEDICINE
Payer: MEDICARE

## 2023-04-25 DIAGNOSIS — M48.02: Primary | ICD-10-CM

## 2023-04-25 DIAGNOSIS — M50.321: ICD-10-CM

## 2023-04-25 DIAGNOSIS — M99.71: ICD-10-CM

## 2023-04-25 PROCEDURE — 72141 MRI NECK SPINE W/O DYE: CPT

## 2023-04-25 NOTE — MR
EXAMINATION TYPE: MR cervical spine wo con

 

DATE OF EXAM: 4/25/2023

 

INDICATION: 

Patient age:Female;  73 years old; 

Reason for study: M54.12 RADICULOPATHY, CERVICAL REGION; PHH. Neck pain that radiates down arms.

 

COMPARISON: None.

 

TECHNIQUE: Multi planar, multi sequence imaging was performed utilizing: T1-weighted, T2-weighted, an
d turbo inversion recovery imaging of the cervical spine. 

IV Contrast: None

 

FINDINGS: 

Alignment: The cervical vertebral bodies have preserved heights. Alignment is straightened.

 

Bones: Scattered Modic endplate changes with osteophytes and disc space narrowing. Multilevel degener
ative disc disease is noted and most pronounced at the C3-C4 through C6-C7 vertebral levels.

 

Cord: The spinal cord is unremarkable with regards to their signal intensity and morphology. 

 

Discs:  Multilevel disc desiccation is present.

 

C2-C3: A disc osteophyte complex is present with mild spinal canal stenosis.  No neural foraminal sade
nosis.

 

C3-C4: A disc osteophyte complex is present with mild spinal canal stenosis.  Bilateral facet and unc
overtebral joint arthropathy are present with mild bilateral neural foraminal stenosis.

 

C4-C5: A disc osteophyte complex is present with mild to moderate spinal canal stenosis.  Bilateral f
acet and uncovertebral joint arthropathy are present with moderate to severe right moderate left neur
al foraminal stenosis.

 

C5-C6: A disc osteophyte complex is present with moderate to severe spinal canal stenosis.  Bilateral
 facet and uncovertebral joint arthropathy are present with moderate to severe bilateral neural austin
inal stenosis.

 

C6-C7: A disc osteophyte complex is present with moderate spinal canal stenosis.  Bilateral facet and
 uncovertebral joint arthropathy are present with moderate to severe bilateral neural foraminal steno
sis.

 

C7-T1: No significant disc pathology. The spinal canal is patent.  No neural foraminal stenosis.

 

Other: None.

 

IMPRESSION:

1. No evidence for disc herniation.

2. This osteophyte complexes at multiple levels from C3-C4 to C6-C7 worse at C5-C6 with moderate to s
evere spinal canal stenosis. Other levels are more moderate in severity.

3. Multilevel disc degeneration with associated osteoarthritic changes. Worse at right C4-C5 and bila
teral C5-C6 and C6-C7 with moderate to severe bilateral neural foraminal stenosis.

## 2023-04-27 ENCOUNTER — HOSPITAL ENCOUNTER (INPATIENT)
Dept: HOSPITAL 47 - EC | Age: 73
LOS: 4 days | Discharge: HOME HEALTH | DRG: 605 | End: 2023-05-01
Attending: INTERNAL MEDICINE | Admitting: INTERNAL MEDICINE
Payer: MEDICARE

## 2023-04-27 DIAGNOSIS — R26.89: ICD-10-CM

## 2023-04-27 DIAGNOSIS — M48.9: ICD-10-CM

## 2023-04-27 DIAGNOSIS — Z91.041: ICD-10-CM

## 2023-04-27 DIAGNOSIS — Z91.040: ICD-10-CM

## 2023-04-27 DIAGNOSIS — I10: ICD-10-CM

## 2023-04-27 DIAGNOSIS — M25.512: ICD-10-CM
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
## 2023-04-27 DIAGNOSIS — R07.89: ICD-10-CM

## 2023-04-27 DIAGNOSIS — F32.A: ICD-10-CM

## 2023-04-27 DIAGNOSIS — F41.9: ICD-10-CM

## 2023-04-27 DIAGNOSIS — K21.9: ICD-10-CM

## 2023-04-27 DIAGNOSIS — M54.2: ICD-10-CM

## 2023-04-27 DIAGNOSIS — Z85.828: ICD-10-CM
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
## 2023-04-27 DIAGNOSIS — E66.9: ICD-10-CM

## 2023-04-27 DIAGNOSIS — J45.909: ICD-10-CM

## 2023-04-27 DIAGNOSIS — Z91.81: ICD-10-CM

## 2023-04-27 DIAGNOSIS — I48.0: ICD-10-CM

## 2023-04-27 DIAGNOSIS — Z88.8: ICD-10-CM

## 2023-04-27 DIAGNOSIS — E78.5: ICD-10-CM

## 2023-04-27 DIAGNOSIS — E11.9: ICD-10-CM

## 2023-04-27 DIAGNOSIS — S50.02XA: Primary | ICD-10-CM

## 2023-04-27 DIAGNOSIS — M17.0: ICD-10-CM

## 2023-04-27 DIAGNOSIS — Z88.1: ICD-10-CM

## 2023-04-27 DIAGNOSIS — Z79.01: ICD-10-CM

## 2023-04-27 DIAGNOSIS — K44.9: ICD-10-CM

## 2023-04-27 DIAGNOSIS — I83.90: ICD-10-CM

## 2023-04-27 DIAGNOSIS — Z87.01: ICD-10-CM

## 2023-04-27 PROCEDURE — 85025 COMPLETE CBC W/AUTO DIFF WBC: CPT

## 2023-04-27 PROCEDURE — 83880 ASSAY OF NATRIURETIC PEPTIDE: CPT

## 2023-04-27 PROCEDURE — 96376 TX/PRO/DX INJ SAME DRUG ADON: CPT

## 2023-04-27 PROCEDURE — 96375 TX/PRO/DX INJ NEW DRUG ADDON: CPT

## 2023-04-27 PROCEDURE — 85730 THROMBOPLASTIN TIME PARTIAL: CPT

## 2023-04-27 PROCEDURE — 71045 X-RAY EXAM CHEST 1 VIEW: CPT

## 2023-04-27 PROCEDURE — 72125 CT NECK SPINE W/O DYE: CPT

## 2023-04-27 PROCEDURE — 72170 X-RAY EXAM OF PELVIS: CPT

## 2023-04-27 PROCEDURE — 93005 ELECTROCARDIOGRAM TRACING: CPT

## 2023-04-27 PROCEDURE — 96374 THER/PROPH/DIAG INJ IV PUSH: CPT

## 2023-04-27 PROCEDURE — 80053 COMPREHEN METABOLIC PANEL: CPT

## 2023-04-27 PROCEDURE — 85610 PROTHROMBIN TIME: CPT

## 2023-04-27 PROCEDURE — 99285 EMERGENCY DEPT VISIT HI MDM: CPT

## 2023-04-27 PROCEDURE — 84145 PROCALCITONIN (PCT): CPT

## 2023-04-27 PROCEDURE — 70450 CT HEAD/BRAIN W/O DYE: CPT

## 2023-04-28 LAB
ALBUMIN SERPL-MCNC: 4 G/DL (ref 3.5–5)
ALP SERPL-CCNC: 84 U/L (ref 38–126)
ALT SERPL-CCNC: 20 U/L (ref 4–34)
ANION GAP SERPL CALC-SCNC: 7 MMOL/L
APTT BLD: 25.8 SEC (ref 22–30)
AST SERPL-CCNC: 21 U/L (ref 14–36)
BASOPHILS # BLD AUTO: 0 K/UL (ref 0–0.2)
BASOPHILS NFR BLD AUTO: 0 %
BUN SERPL-SCNC: 27 MG/DL (ref 7–17)
CALCIUM SPEC-MCNC: 9.4 MG/DL (ref 8.4–10.2)
CHLORIDE SERPL-SCNC: 102 MMOL/L (ref 98–107)
CO2 SERPL-SCNC: 33 MMOL/L (ref 22–30)
EOSINOPHIL # BLD AUTO: 0.1 K/UL (ref 0–0.7)
EOSINOPHIL NFR BLD AUTO: 1 %
ERYTHROCYTE [DISTWIDTH] IN BLOOD BY AUTOMATED COUNT: 5.16 M/UL (ref 3.8–5.4)
ERYTHROCYTE [DISTWIDTH] IN BLOOD: 14.1 % (ref 11.5–15.5)
GLUCOSE SERPL-MCNC: 114 MG/DL (ref 74–99)
HCT VFR BLD AUTO: 45.7 % (ref 34–46)
HGB BLD-MCNC: 14.7 GM/DL (ref 11.4–16)
INR PPP: 1 (ref ?–1.2)
LYMPHOCYTES # SPEC AUTO: 2.4 K/UL (ref 1–4.8)
LYMPHOCYTES NFR SPEC AUTO: 31 %
MCH RBC QN AUTO: 28.4 PG (ref 25–35)
MCHC RBC AUTO-ENTMCNC: 32.1 G/DL (ref 31–37)
MCV RBC AUTO: 88.5 FL (ref 80–100)
MONOCYTES # BLD AUTO: 0.5 K/UL (ref 0–1)
MONOCYTES NFR BLD AUTO: 6 %
NEUTROPHILS # BLD AUTO: 4.5 K/UL (ref 1.3–7.7)
NEUTROPHILS NFR BLD AUTO: 58 %
PLATELET # BLD AUTO: 247 K/UL (ref 150–450)
POTASSIUM SERPL-SCNC: 4.3 MMOL/L (ref 3.5–5.1)
PROT SERPL-MCNC: 6.8 G/DL (ref 6.3–8.2)
PT BLD: 10.3 SEC (ref 9–12)
SODIUM SERPL-SCNC: 142 MMOL/L (ref 137–145)
WBC # BLD AUTO: 7.7 K/UL (ref 3.8–10.6)

## 2023-04-28 RX ADMIN — PROPAFENONE HYDROCHLORIDE SCH MG: 225 TABLET, FILM COATED ORAL at 16:09

## 2023-04-28 RX ADMIN — NYSTATIN SCH APPLIC: 100000 POWDER TOPICAL at 16:09

## 2023-04-28 RX ADMIN — Medication SCH MG: at 17:26

## 2023-04-28 RX ADMIN — HEPARIN SODIUM SCH UNIT: 5000 INJECTION INTRAVENOUS; SUBCUTANEOUS at 20:29

## 2023-04-28 RX ADMIN — LATANOPROST SCH DROPS: 50 SOLUTION OPHTHALMIC at 20:29

## 2023-04-28 RX ADMIN — HYDROCODONE BITARTRATE AND ACETAMINOPHEN PRN EACH: 5; 325 TABLET ORAL at 23:56

## 2023-04-28 RX ADMIN — NYSTATIN SCH APPLIC: 100000 POWDER TOPICAL at 20:30

## 2023-04-28 RX ADMIN — PROPAFENONE HYDROCHLORIDE SCH MG: 225 TABLET, FILM COATED ORAL at 23:53

## 2023-04-28 RX ADMIN — HYDROCODONE BITARTRATE AND ACETAMINOPHEN PRN EACH: 5; 325 TABLET ORAL at 17:27

## 2023-04-28 NOTE — HP
HISTORY AND PHYSICAL



CHIEF COMPLAINT:

Fall and bruises.



HISTORY OF PRESENT ILLNESS:

This 73-year-old woman with a past medical history of multiple medical issues,

including atrial fibrillation, asthma, hypertension, was apparently trying to go

upstairs at home, the patient missed a step and had a fall.  The patient had 
multiple

contusions at this time.  The patient had a complete skeletal survey and 
Orthopedics is

also following the patient closely.  The patient had left elbow contusion and 

Orthopedic Surgery.  The patient also had left shoulder pain as well.  There is 
no

significant fever, rigors, or chills.  Patient does have gait dysfunction.



PAST MEDICAL HISTORY:

Atrial fibrillation, asthma, rest of the history and rest of the chart is also

reviewed.



HOME MEDICATIONS:

Reviewed include Prinivil. Doses and rest of medication noted.



ALLERGIES:

Reviewed include Cipro. Rest of the allergies noted.



FAMILY HISTORY:

History of cancer in the family.



SOCIAL HISTORY:

No history of smoking.  CBD lip balm.



FAMILY HISTORY:

History of colon cancer.



REVIEW OF SYSTEMS:

A 14-point review is negative except as mentioned earlier.



PHYSICAL EXAMINATION:

VITAL SIGNS: Pulse is 68, blood pressure 111/60, respirations 16.

HEENT: Conjunctivae normal.  Mucosa dry.

NECK: No JVD.

CARDIOVASCULAR: S1, S2.

RESPIRATION: A few scattered rhonchi.

ABDOMEN:  Soft, nontender.

EXTREMITIES: Legs; minimal bilateral leg edema and bilateral leg bruise also 
present.

NERVOUS SYSTEM: Diffusely weak skin, diffuse bruises.  Joints are painful. 
Movements

are painful.



LABORATORY DATA:

Noted.



DIAGNOSTIC DATA:

X-rays reviewed personally.  Chest x-ray shows some increased bronchovascular 
markings.



ASSESSMENT:

1. Fall and diffuse contusions with left elbow pain and possible local fracture.

2. Diffuse aches and pains.

3. Gait dysfunction.

4. History of atrial fibrillation.

5. Asthma.

6. Rule out congestive heart failure.

7. Hypertension.

8. Hyperlipidemia.

9. History of pneumonia.

10.Multiple medical issues.



RECOMMENDATIONS:

This 73-year-old woman who presented with multiple complex medical issues. At 
this time

I would recommend continue the current medications, continue the symptomatic 
treatment

of the pain, PT/OT evaluation. Resume the home medications once they confirmed. 
I would

also recommend BNP and cut down the IV rate. PT/OT evaluation, Orthopedic 
evaluation

appreciated.  Pain management.  See orders for details.  Prognosis guarded.  DVT

prophylaxis.  Further recommendations to follow. This patient will require 
definitely

more than 2-night stay an inpatient stay in order to elucidate the above-
mentioned

multiple complex medical issues.  Further recommendations follow.





MMODL / IJN: [id number] / Job#: [id number]

MTDD

## 2023-04-28 NOTE — P.CNOR
History of Present Illness





- [facility]


Consult date: 04/28/23


Consult reason: joint pain


History of present illness: 


The patient is a very pleasant previously healthy 73-year-old female who is 

currently in the process of getting admitted to internal medicine following a 

ground-level fall yesterday.  The patient was seen at bedside this morning in 

the emergency department.  The patient reports a fall when her dog pulled her 

down yesterday.  She had immediate pain in her left elbow and bilateral lower 

extremities.  At time of my evaluation she is complaining of left shoulder and 

elbow pain as well as bilateral knee pain.  The patient also reports some neck 

discomfort but this is chronic for her.  She is presently seeing a spine surgeon

name Dr. Hinojosa who is been working her back up with MRIs and she apparently may

have a surgical procedure planned in the near future.





Past Medical History


Past Medical History: Atrial Fibrillation, Asthma, Cancer, Chest Pain / Angina, 

GERD/Reflux, Hyperlipidemia, Hypertension, Osteoarthritis (OA), Pneumonia


Additional Past Medical History / Comment(s): varicose veins, hx ulcer, hiatal 

hernia, constipation, degenerative disk, itchy skin, diet control diabetic, hx 

anemia, "problem with immunity", skin cancer, abnormal vaginal bleeding


History of Any Multi-Drug Resistant Organisms: None Reported


Past Surgical History: Adenoidectomy, Appendectomy, Heart Catheterization, 

Tonsillectomy


Additional Past Surgical History / Comment(s): laser eye surgeries, laparoscopy 

x 3-4, cystoscopy, D&C's, colonoscopy


Past Anesthesia/Blood Transfusion Reactions: Previous Problems w/ Anesthesia, 

Motion Sickness, Postoperative Nausea & Vomiting (PONV)


Additional Past Anesthesia/Blood Transfusion Reaction / Comm: "told diff in 

recovery and had to bring me back" - not sure what surgery(with general 

anesthesia). takes a little longer for anesthesia to take affect and longer to 

come out


Past Psychological History: Anxiety, Depression


Past Alcohol Use History: Rare


Past Drug Use History: None Reported





- Past Family History


  ** Father


Family Medical History: Cancer


Additional Family Medical History / Comment(s): grandmother had colon cancer





Medications and Allergies


                                Home Medications











 Medication  Instructions  Recorded  Confirmed  Type


 


Acyclovir 400 mg PO TID PRN 04/06/18 04/28/23 History


 


Propafenone [Rythmol] 225 mg PO Q8H #90 tab 04/08/18 04/28/23 Rx


 


Rivaroxaban [Xarelto] 20 mg PO DAILY 06/12/20 04/28/23 History


 


HYDROcodone/APAP 10-325MG [Norco 1 tab PO Q4HR PRN #18 tab 04/28/23  Rx





]    


 


Latanoprost/Pf [Latanoprost 0.005% 1 drop BOTH EYES HS 04/28/23 04/28/23 History





Eye Drop]    


 


hydroCHLOROthiazide 12.5 mg PO DAILY 04/28/23 04/28/23 History


 


lisinopriL [Prinivil] 10 mg PO DAILY 04/28/23 04/28/23 History








                                    Allergies











Allergy/AdvReac Type Severity Reaction Status Date / Time


 


azithromycin [From Zithromax] Allergy  Unknown Verified 04/28/23 08:55


 


ciprofloxacin Allergy  Unknown Verified 04/28/23 08:55


 


fluoxetine Allergy  Unknown Verified 04/28/23 08:55


 


Iodinated Contrast Media Allergy  Rash/Hives Verified 04/28/23 08:55


 


iodine Allergy  Rash/Hives Verified 04/28/23 08:55


 


latex Allergy  Dyspnea Verified 04/28/23 08:55


 


Latex, Natural Rubber Allergy  Dyspnea Verified 04/28/23 08:55


 


paroxetine [From Paxil] Allergy  Unknown Verified 04/28/23 08:55


 


shellfish derived [Shellfish] Allergy  Unknown Verified 04/28/23 08:55


 


selective serotonin reuptake Allergy Unknown Dyspnea Uncoded 04/28/23 08:55





inhib.     














Physical Examination





The patient is sitting comfortably on the gurney in the emergency department.  

She is alert and able to answer questions.  Her head is normocephalic and 

atraumatic.  She demonstrate number breathing with symmetric chest expansion.  

On inspection of the right upper extremity there are no deformities, no tend

erness, no pain with passive range of motion of the shoulder, elbow, or wrist.  

On inspection of the left arm she is in a sling.  She has mild tenderness 

diffusely over the left shoulder and pain with passive range of motion.  There 

is mild tenderness over the elbow but minimal pain with passive range of motion.

 There is no tenderness over the wrist.  On inspection of the left leg there is 

a superficial abrasion over the knee.  Her some mild knee effusion.  She has 

tenderness over the medial joint line.  There is no pain with passive range of 

motion of the hip.  On inspection of the right leg there are no skin lesions.  

There is no pain with passive range motion of the right hip.  He has tenderness 

over the medial joint line of the right knee.





Results





X-rays of the left elbow show no acute fractures.


X-ray of the pelvis shows diffuse osteopenia but no fractures in the pelvis or 

hips.


X-rays of the left knee show arthritis in the medial compartment but no evidence

of arthritis.





- Labs


Labs: 


                  Abnormal Lab Results - Last 24 Hours (Table)











  04/28/23 Range/Units





  09:03 


 


Carbon Dioxide  33 H  (22-30)  mmol/L


 


BUN  27 H  (7-17)  mg/dL


 


Glucose  114 H  (74-99)  mg/dL








                                      H & H











  04/28/23 Range/Units





  09:03 


 


Hgb  14.7  (11.4-16.0)  gm/dL


 


Hct  45.7  (34.0-46.0)  %








                                   Coagulation











  04/28/23 Range/Units





  09:03 


 


INR  1.0  (<1.2)  











Result Diagrams: 


                                 04/28/23 09:03





                                 04/28/23 09:03





Assessment and Plan


Assessment: 





Left elbow contusion, possible occult fracture


Bilateral knee arthritis, likely acute exacerbation


Left shoulder pain following ground level fall


Pre-existing spine disease being managed by another provider


Plan: 





Due to the patient's left shoulder pain following a fall we will check x-rays of

left shoulder.  In regards the patient's left elbow I see no acute fractures on 

her x-rays.  I recommend a sling and follow-up x-rays in the office in 7-10 

days.  She also has chronic knee pain and likely exacerbated pre-existing 

arthritis.  We discussed follow-up in the office for this.  If the x-rays of her

shoulder are negative she is okay to discharge home from an orthopedic 

standpoint and she can follow-up in the office in a week for repeat x-rays.





In regards to the patient's neck pain I will defer to her spine surgeon Dr. Hinojosa as I do not manage spine issues.  If she has ongoing issues during this 

hospitalization I recommend contacting Dr. Hinojosa for treatment recommendations,

further work-up, restrictions and follow-up as he is actively managing her spine

issues and is presently planning surgery.


Time with Patient: Greater than 30

## 2023-04-28 NOTE — XR
EXAMINATION TYPE: XR shoulder complete LT

 

DATE OF EXAM: 4/28/2023

 

CLINICAL HISTORY: Fall injury with pain and limited range of motion

 

TECHNIQUE:  Three views of the left shoulder are obtained.

 

COMPARISON: Prior left humeral x-ray 2013. 

 

FINDINGS:  Osseous structures are demineralized which is noted to the radiographic sensitivity. There
 is no acute fracture/dislocation evident in the left shoulder. Moderate to severe narrowing at the a
cromioclavicular joint redemonstrated. Tiny spur from the inferior acromion is seen. Glenohumeral janice
nt is maintained. There is 9 mm sclerotic focus lateral left humeral surgical neck level current stud
y which is nonspecific not seen on prior. Whole body bone scan follow-up can be considered if there i
s history of primary neoplasm to assess for possible sclerotic osseous metastatic disease. Correlate 
clinically. The visualized ribs are intact and unremarkable.

 

IMPRESSION: As above.

## 2023-04-28 NOTE — XR
EXAM:

  XR Left Knee, 3 Views

 

CLINICAL HISTORY:

  Trauma

 

TECHNIQUE:

  Three views of the left knee.

 

COMPARISON:

  10/24/2013

 

FINDINGS:

  Bones/joints:  Interval progression of degenerative changes most 

notable involving the medial compartment, at least moderate in appearance.

  Heterotopic subcentimeter calcification noted within the medial 

compartment adjacent to the tibial spine measuring 4 mm.  No acute 

fracture.  No dislocation.

  Soft tissues:  Unremarkable.

 

IMPRESSION:     

  Heterotopic subcentimeter calcification noted within the medial 

compartment adjacent to the tibial spine measuring 4 mm.  This is 

presumed degenerative.  However, a subtle avulsion injury cannot entirely 

be excluded.  No other evidence for fracture or abnormal alignment.  

Incidental progressive degenerative changes involving the medial 

compartment.

## 2023-04-28 NOTE — ED
Fall HPI





- General


Chief Complaint: Fall


Stated Complaint: Fall


Time Seen by Provider: 04/27/23 23:00


Source: patient, EMS


Mode of arrival: EMS





- History of Present Illness


Initial Comments: 





73-year-old female past history of A. fib presents to emergency department after

a fall.  She states she was attempting to go down stairs at home when her dog 

got spooked and pulled her down two step.  She states that she had her head.  

She is on anticoagulation.  Denies any headaches or visual changes.  Does report

to left elbow and left knee pain.  Patient is right-hand dominant.  She was able

to get up and ambulate.  Did receive a dose of Toradol from EMS.  No other 

alleviating, precipitating factors





- Related Data


                                Home Medications











 Medication  Instructions  Recorded  Confirmed


 


Acyclovir 400 mg PO TID PRN 04/06/18 04/28/23








                                  Previous Rx's











 Medication  Instructions  Recorded


 


Folic Acid 1 mg PO DAILY #30 tablet 05/01/23


 


HYDROcodone/APAP 5-325MG [Norco 1 each PO Q6HR PRN #9 tab 05/01/23





5-325]  


 


Latanoprost/Pf [Latanoprost 0.005% 1 drop BOTH EYES HS #5 ml 05/01/23





Eye Drop]  


 


Multivitamins, Thera [Multivitamin] 1 tab PO DAILY #30 tablet 05/01/23


 


Propafenone [Rythmol] 225 mg PO Q8H 30 Days #90 tab 05/01/23


 


Rivaroxaban [Xarelto] 20 mg PO DAILY #30 tab 05/01/23


 


Thiamine [Vitamin B-1] 100 mg PO DAILY #30 tablet 05/01/23


 


hydroCHLOROthiazide 12.5 mg PO DAILY #30 cap 05/01/23


 


lisinopriL [Prinivil] 10 mg PO DAILY #30 tab 05/01/23











                                    Allergies











Allergy/AdvReac Type Severity Reaction Status Date / Time


 


azithromycin [From Zithromax] Allergy  Unknown Verified 04/28/23 08:55


 


ciprofloxacin Allergy  Unknown Verified 04/28/23 08:55


 


fluoxetine Allergy  Unknown Verified 04/28/23 08:55


 


Iodinated Contrast Media Allergy  Rash/Hives Verified 04/28/23 08:55


 


iodine Allergy  Rash/Hives Verified 04/28/23 08:55


 


latex Allergy  Dyspnea Verified 04/28/23 08:55


 


Latex, Natural Rubber Allergy  Dyspnea Verified 04/28/23 08:55


 


paroxetine [From Paxil] Allergy  Unknown Verified 04/28/23 08:55


 


shellfish derived [Shellfish] Allergy  Unknown Verified 04/28/23 08:55


 


selective serotonin reuptake Allergy Unknown Dyspnea Uncoded 04/28/23 08:55





inhib.     














Review of Systems


ROS Statement: 


Those systems with pertinent positive or pertinent negative responses have been 

documented in the HPI.





ROS Other: All systems not noted in ROS Statement are negative.





Past Medical History


Past Medical History: Atrial Fibrillation, Asthma, Cancer, Chest Pain / Angina, 

GERD/Reflux, Hyperlipidemia, Hypertension, Osteoarthritis (OA), Pneumonia


Additional Past Medical History / Comment(s): varicose veins, hx ulcer, hiatal 

hernia, constipation, degenerative disk, itchy skin, diet control diabetic, hx 

anemia, "problem with immunity", skin cancer, abnormal vaginal bleeding


History of Any Multi-Drug Resistant Organisms: None Reported


Past Surgical History: Adenoidectomy, Appendectomy, Heart Catheterization, 

Tonsillectomy


Additional Past Surgical History / Comment(s): laser eye surgeries, laparoscopy 

x 3-4, cystoscopy, D&C's, colonoscopy


Past Anesthesia/Blood Transfusion Reactions: Previous Problems w/ Anesthesia, 

Motion Sickness, Postoperative Nausea & Vomiting (PONV)


Additional Past Anesthesia/Blood Transfusion Reaction / Comment(s): "told diff 

in recovery and had to bring me back" - not sure what surgery(with general 

anesthesia). takes a little longer for anesthesia to take affect and longer to 

come out


Past Psychological History: Anxiety, Depression


Past Alcohol Use History: Rare


Past Drug Use History: None Reported





- Past Family History


  ** Father


Family Medical History: Cancer


Additional Family Medical History / Comment(s): grandmother had colon cancer





General Exam


Limitations: no limitations


General appearance: alert, in no apparent distress


Head exam: Present: atraumatic, normocephalic, normal inspection


Eye exam: Present: normal appearance, PERRL, EOMI.  Absent: scleral icterus, 

conjunctival injection, periorbital swelling


ENT exam: Present: normal exam, mucous membranes moist


Neck exam: Present: normal inspection.  Absent: tenderness, meningismus, 

lymphadenopathy


Respiratory exam: Present: normal lung sounds bilaterally.  Absent: respiratory 

distress, wheezes, rales, rhonchi, stridor


Cardiovascular Exam: Present: regular rate, normal rhythm, normal heart sounds. 

 Absent: systolic murmur, diastolic murmur, rubs, gallop, clicks


GI/Abdominal exam: Present: soft, normal bowel sounds.  Absent: distended, 

tenderness, guarding, rebound, rigid


Extremities exam: Present: tenderness (left elbow. No gross deformity. 2+ radial

 and ulnar pulses), normal capillary refill.  Absent: pedal edema, joint 

swelling, calf tenderness


Back exam: Present: normal inspection


Neurological exam: Present: alert, oriented X3, CN II-XII intact


Psychiatric exam: Present: normal affect, normal mood


Skin exam: Present: warm, dry, intact, normal color.  Absent: rash





Course


                                   Vital Signs











  04/27/23 04/28/23 04/28/23





  22:55 02:00 06:31


 


Temperature 96.9 F L 98.5 F 98.3 F


 


Pulse Rate 77 84 80


 


Pulse Rate [   





Pulse Oximetery   





]   


 


Respiratory 16 20 18





Rate   


 


Blood Pressure 165/74 136/78 134/76


 


Blood Pressure   





[Right Arm]   


 


O2 Sat by Pulse 98 99 98





Oximetry   














  04/28/23





  10:44


 


Temperature 97.6 F


 


Pulse Rate 


 


Pulse Rate [ 68





Pulse Oximetery 





] 


 


Respiratory 16





Rate 


 


Blood Pressure 


 


Blood Pressure 111/63





[Right Arm] 


 


O2 Sat by Pulse 97





Oximetry 














Medical Decision Making





- Medical Decision Making





Was pt. sent in by a medical professional or institution ([facility], NP, urgent 

care, hospital, or nursing home...) When possible be specific


@  -No


Did you speak to anyone other than the patient for history (EMS, parent, family,

police, friend...)? What history was obtained from this source 


@  -EMS


Did you review nursing and triage notes (agree or disagree)?  Why? 


@  -I reviewed and agree with nursing and triage notes


Were old charts reviewed (outside hosp., previous admission, EMS record, old 

EKG, old radiological studies, urgent care reports/EKG's, nursing home records)?

Report findings 


@  -No old charts were reviewed


Differential Diagnosis (chest pain, altered mental status, abdominal pain women,

abdominal pain men, vaginal bleeding, weakness, fever, dyspnea, syncope, 

headache, dizziness, GI bleed, back pain, seizure, CVA, palpatations, mental 

health, musculoskeletal)? 


@  -fracture, sprain, dislocation, joint effusion, ligamentous injury


EKG interpreted by me (3pts min.).


@  -Yes


X-rays interpreted by me (1pt min.).


@  -Yes


CT interpreted by me (1pt min.).


@  -None done


U/S interpreted by me (1pt. min.).


@  -None done


What testing was considered but not performed or refused? (CT, X-rays, U/S, 

labs)? Why?


@  -None


What meds were considered but not given or refused? Why?


@  -None


Did you discuss the management of the patient with other professionals 

(professionals i.e. , PA, NP, lab, RT, psych nurse, , , 

teacher, , )? Give summary


@  -Dr. Holm


Was smoking cessation discussed for >3mins.?


@  -No


Was critical care preformed (if so, how long)?


@  -No


Were there social determinants of health that impacted care today? How? 

(Homelessness, low income, unemployed, alcoholism, drug addiction, 

transportation, low edu. Level, literacy, decrease access to med. care, [facility], 

rehab)?


@  -No


Was there de-escalation of care discussed even if they declined (Discuss DNR or 

withdrawal of care, Hospice)? DNR status


@  -No


What co-morbidities impacted this encounter? (DM, HTN, Smoking, COPD, CAD, 

Cancer, CVA, ARF, Chemo, Hep., AIDS, mental health diagnosis, sleep apnea, 

morbid obesity)?


@  -None


Was patient admitted / discharged? Hospital course, mention meds given and 

route, prescriptions, significant lab abnormalities, going to OR and other 

pertinent info.


@  -Upon arrival patient is placed into room 8.  Thorough history and physical 

exam is performed.  IV access established patient was given 4 mg of morphine for

pain control.  CT of the brain and cervical spine is performed which starts no 

acute intracranial process.  No cervical fractures.  C-collar is removed.  X-

rays are performed of the left elbow, left knee, chest and pelvis which fails to

identify a fracture.  Patient does have considerable amount of pain which 

persists in the left elbow.  Because this patient was placed in a sling.  She 

was given several doses of pain medication.  We do attempt to ambulate the 

patient however she states that the pain is so significant in her left elbow 

that she doesn't feel steady on her feet.  Daughter does call and mention that 

she is concerned about her mother's living conditions and that she is a fall 

risk.  I did call and speak with Dr. holm who was agreeable to have the patient

admitted for orthopedic consult and physical therapy


Undiagnosed new problem with uncertain prognosis?


@  -yes


Drug Therapy requiring intensive monitoring for toxicity (Heparin, Nitro, 

Insulin, Cardizem)?


@  -No


Were any procedures done?


@  -No


Diagnosis/symptom?


@  -acute fall, left elbow pain


Acute, or Chronic, or Acute on Chronic?


@  -acute


Uncomplicated (without systemic symptoms) or Complicated (systemic symptoms)?


@  -complicated


Side effects of treatment?


@  -No


Exacerbation, Progression, or Severe Exacerbation?


@  -No


Poses a threat to life or bodily function? How? (Chest pain, USA, MI, pneumonia,

PE, COPD, DKA, ARF, appy, cholecystitis, CVA, Diverticulitis, Homicidal, 

Suicidal, threat to staff... and all critical care pts)


@  -No





- Lab Data


Result diagrams: 


                                 04/29/23 08:15





                                 04/29/23 08:15





- EKG Data


EKG Comments: 





EKG demonstrates sinus rhythm with a rate of 71.  SD interval 218.  .  

QTC of 445.  Inverted T waves with ST depression 1, 2, aVF, V4 through V6.  Old 

EKG from 2020 has similar appearance





Disposition


Clinical Impression: 


 Fall, Left elbow pain





Disposition: ADMITTED AS IP TO THIS HOSP


Condition: Stable


Is patient prescribed a controlled substance at d/c from ED?: Yes


When asked, does pt state using other controlled substances?: No


If prescribed controlled substance>3 days was MAPS reviewed?: Prescribed <3 Days


Time of Disposition: 03:38

## 2023-04-28 NOTE — XR
EXAM:

  XR Chest, 1 View

 

CLINICAL HISTORY:

  ITS.REASON XR Reason: trauma

 

TECHNIQUE:

  Frontal view of the chest.

 

COMPARISON:

  No relevant prior studies available.

 

FINDINGS:

  Lungs:  Unremarkable.  No focal infiltrate.

  Pleural space:  Unremarkable.  No pneumothorax.

  Heart:  Cardiomegaly.

  Mediastinum:  Unremarkable.

  Bones/joints:  Unremarkable.

 

IMPRESSION:     

  No focal infiltrate.

## 2023-04-28 NOTE — CT
EXAM:

  CT Head Without Intravenous Contrast

 

CLINICAL HISTORY:

  ITS.REASON CT Reason: trauma

 

TECHNIQUE:

  Axial computed tomography images of the head/brain without intravenous 

contrast.  CTDI is 45.2 mGy and DLP is 53505.5 mGy-cm.  This CT exam was 

performed using one or more of the following dose reduction techniques: 

automated exposure control, adjustment of the mA and/or kV according to 

patient size, and/or use of iterative reconstruction technique.

 

COMPARISON:

  MRI 11/11/2013

 

FINDINGS:

  Brain:  No acute intracranial hemorrhage, acute territorial infarct, or 

significant mass effect.  Nonspecific areas of hypoattenuation in the 

periventricular white matter likely represent the sequela of chronic 

small vessel ischemic disease.

  Ventricles:  Ventricular and sulcal prominence commensurate with the 

patient's age.

  Bones/joints:  No acute abnormality.

  Soft tissues:  No significant abnormality.

  Sinuses:  Mild mucosal thickening.  Mall retention cyst or polyp in the 

left maxillary sinus.

  Mastoid air cells:  No significant abnormality.

 

IMPRESSION:     

  No acute intracranial hemorrhage or calvarial fracture.

 

_______________________________________________

 

EXAM:

  CT Cervical Spine Without Intravenous Contrast

 

CLINICAL HISTORY:

  ITS.REASON CT Reason: trauma

 

TECHNIQUE:

  Axial computed tomography images of the cervical spine without 

intravenous contrast.  CTDI is 12.5 mGy and DLP is 337.8 mGy-cm.  This CT 

exam was performed using one or more of the following dose reduction 

techniques: automated exposure control, adjustment of the mA and/or kV 

according to patient size, and/or use of iterative reconstruction 

technique.

 

COMPARISON:

  MRI 4/25/23

 

FINDINGS:

  Vertebrae:  Straightening of the normal cervical lordosis.  No acute 

fracture or malalignment.

  Discs/spinal canal/neural foramina:  Disc height loss, osteophytes, 

uncovertebral spurs, and facet arthropathy.  Multilevel foraminal and 

spinal stenosis.

  Soft tissues:  No significant abnormality.

 

IMPRESSION:     

  No acute fracture or malalignment.

## 2023-04-28 NOTE — XR
EXAM:

  XR Left Elbow Complete, 3 or More Views

 

CLINICAL HISTORY:

  ITS. REASON XR Reason: trauma

 

TECHNIQUE:

  Frontal, lateral and oblique views of the left elbow.

 

COMPARISON:

  No relevant prior studies available.

 

FINDINGS:

  Bones/joints:  Unremarkable.  No acute fracture or dislocation.

  Soft tissues:  Unremarkable.

 

IMPRESSION:     

No acute fracture or dislocation.

## 2023-04-29 LAB
ALBUMIN SERPL-MCNC: 3.7 G/DL (ref 3.5–5)
ALBUMIN/GLOB SERPL: 1.3 {RATIO}
ALP SERPL-CCNC: 76 U/L (ref 38–126)
ALT SERPL-CCNC: 18 U/L (ref 4–34)
ANION GAP SERPL CALC-SCNC: 8 MMOL/L
AST SERPL-CCNC: 20 U/L (ref 14–36)
BASOPHILS # BLD AUTO: 0 K/UL (ref 0–0.2)
BASOPHILS NFR BLD AUTO: 0 %
BUN SERPL-SCNC: 33 MG/DL (ref 7–17)
CALCIUM SPEC-MCNC: 9.3 MG/DL (ref 8.4–10.2)
CHLORIDE SERPL-SCNC: 103 MMOL/L (ref 98–107)
CO2 SERPL-SCNC: 29 MMOL/L (ref 22–30)
EOSINOPHIL # BLD AUTO: 0.2 K/UL (ref 0–0.7)
EOSINOPHIL NFR BLD AUTO: 4 %
ERYTHROCYTE [DISTWIDTH] IN BLOOD BY AUTOMATED COUNT: 5.08 M/UL (ref 3.8–5.4)
ERYTHROCYTE [DISTWIDTH] IN BLOOD: 13.8 % (ref 11.5–15.5)
GLOBULIN SER CALC-MCNC: 2.8 G/DL
GLUCOSE SERPL-MCNC: 114 MG/DL (ref 74–99)
HCT VFR BLD AUTO: 46.2 % (ref 34–46)
HGB BLD-MCNC: 14.3 GM/DL (ref 11.4–16)
LYMPHOCYTES # SPEC AUTO: 2.4 K/UL (ref 1–4.8)
LYMPHOCYTES NFR SPEC AUTO: 42 %
MCH RBC QN AUTO: 28 PG (ref 25–35)
MCHC RBC AUTO-ENTMCNC: 30.9 G/DL (ref 31–37)
MCV RBC AUTO: 90.9 FL (ref 80–100)
MONOCYTES # BLD AUTO: 0.5 K/UL (ref 0–1)
MONOCYTES NFR BLD AUTO: 8 %
NEUTROPHILS # BLD AUTO: 2.4 K/UL (ref 1.3–7.7)
NEUTROPHILS NFR BLD AUTO: 43 %
PLATELET # BLD AUTO: 197 K/UL (ref 150–450)
POTASSIUM SERPL-SCNC: 4.7 MMOL/L (ref 3.5–5.1)
PROT SERPL-MCNC: 6.5 G/DL (ref 6.3–8.2)
SODIUM SERPL-SCNC: 140 MMOL/L (ref 137–145)
WBC # BLD AUTO: 5.6 K/UL (ref 3.8–10.6)

## 2023-04-29 RX ADMIN — PANTOPRAZOLE SODIUM SCH MG: 40 TABLET, DELAYED RELEASE ORAL at 06:39

## 2023-04-29 RX ADMIN — LATANOPROST SCH DROPS: 50 SOLUTION OPHTHALMIC at 21:15

## 2023-04-29 RX ADMIN — PROPAFENONE HYDROCHLORIDE SCH MG: 225 TABLET, FILM COATED ORAL at 09:15

## 2023-04-29 RX ADMIN — FOLIC ACID SCH MG: 1 TABLET ORAL at 12:32

## 2023-04-29 RX ADMIN — HYDROCODONE BITARTRATE AND ACETAMINOPHEN PRN EACH: 5; 325 TABLET ORAL at 12:32

## 2023-04-29 RX ADMIN — THERA TABS SCH EACH: TAB at 12:32

## 2023-04-29 RX ADMIN — HEPARIN SODIUM SCH UNIT: 5000 INJECTION INTRAVENOUS; SUBCUTANEOUS at 21:15

## 2023-04-29 RX ADMIN — NYSTATIN SCH APPLIC: 100000 POWDER TOPICAL at 21:15

## 2023-04-29 RX ADMIN — NYSTATIN SCH APPLIC: 100000 POWDER TOPICAL at 09:15

## 2023-04-29 RX ADMIN — Medication SCH MG: at 18:24

## 2023-04-29 RX ADMIN — HEPARIN SODIUM SCH UNIT: 5000 INJECTION INTRAVENOUS; SUBCUTANEOUS at 09:07

## 2023-04-29 RX ADMIN — PROPAFENONE HYDROCHLORIDE SCH MG: 225 TABLET, FILM COATED ORAL at 18:28

## 2023-04-29 RX ADMIN — Medication SCH MG: at 06:39

## 2023-04-29 RX ADMIN — HYDROCODONE BITARTRATE AND ACETAMINOPHEN PRN EACH: 5; 325 TABLET ORAL at 18:24

## 2023-04-29 RX ADMIN — HYDROCODONE BITARTRATE AND ACETAMINOPHEN PRN EACH: 5; 325 TABLET ORAL at 06:41

## 2023-04-29 NOTE — P.PN
Subjective


Progress Note Date: 04/29/23





Patient continues to have pain in her shoulder elbow on the left.  No other 

complaints.





Objective





- Vital Signs


Vital signs: 


                                   Vital Signs











Temp  97.6 F   04/29/23 03:18


 


Pulse  63   04/29/23 03:18


 


Resp  16   04/29/23 03:18


 


BP  103/62   04/29/23 03:18


 


Pulse Ox  97   04/29/23 03:18


 


FiO2      








                                 Intake & Output











 04/28/23 04/29/23 04/29/23





 18:59 06:59 18:59


 


Intake Total 118  


 


Balance 118  


 


Weight 93.44 kg  


 


Intake:   


 


  Oral 118  


 


Other:   


 


  Voiding Method  Toilet 


 


  # Voids 3 0 














- Exam





The patient is resting comfortably in her bed.  Her arm is in a sling.  She has 

mild tenderness in the shoulder and elbow on the left.





- Labs


CBC & Chem 7: 


                                 04/28/23 09:03





                                 04/28/23 09:03


Labs: 


                  Abnormal Lab Results - Last 24 Hours (Table)











  04/28/23 Range/Units





  09:03 


 


Carbon Dioxide  33 H  (22-30)  mmol/L


 


BUN  27 H  (7-17)  mg/dL


 


Glucose  114 H  (74-99)  mg/dL














Assessment and Plan


Plan: 





X-rays of the shoulder show no acute fractures.  The patient is okay to abran duncan from an orthopedic standpoint.  We discussed the possibility of an 

occult fracture.  She will continue use of the sling and can weight-bear as 

tolerated on the left arm.  We will repeat x-rays in the office in a week.  We 

will sign off at this time.

## 2023-04-29 NOTE — P.PN
Subjective


Progress Note Date: 04/29/23


 This is a pleasant 68-year-old  female  with a past medical history 

significant for paroxysmal atrial fibrillation, obesity, and 

hypertension.Patient states she was moving houses and had a pet dog that pulled 

on their leash causing her to fall and had a mechanical fall.  She has had 

extensive chest pain, left elbow pain and left knee pain since then.  She denies

any actual syncope or lightheadedness.  She was noted to be tachycardic on 

presentation however admits he was in significant amount of pain.  By the time 

EKG was performed that showed sinus rhythm with similar diffuse T-wave inversi

ons V3 through the 6 similar to prior daily.  She denies any chest pain other 

than her reproducible left rib pain worse with inspiration and manipulation.  

She has recently follow with Dr. Maat in denies any changes to medications.  

She was feeling some palpitations however was restarted on her Rythmol is 

feeling better.  Not currently on any telemetry





4/29.  Patient seen and examined.  Still complaining of left elbow and shoulder 

pain





REVIEW OF SYSTEMS: 


CONSTITUTIONAL: No fever, no malaise,. 


CARDIOVASCULAR: No chest pain, no palpitations, no syncope. 


PULMONARY: No shortness of breath, no cough, 


GASTROINTESTINAL: No diarrhea, no nausea, no vomiting, no abdominal pain. 


NEUROLOGICAL: No headaches, no weakness, 





PHYSICAL EXAMINATION: 





GENERAL: The patient is alert and oriented x3, not in any acute distress. Well 

developed, well nourished. 


HEENT: Pupils are round and equally reacting to light. EOMI. No scleral icterus.

No conjunctival pallor. Normocephalic, atraumatic. No pharyngeal erythema. No 

thyromegaly. 


CARDIOVASCULAR: S1 and S2 present. No murmurs, rubs, or gallops. 


PULMONARY: Chest is clear to auscultation, no wheezing or crackles. 


ABDOMEN: Soft, nontender, nondistended, normoactive bowel sounds. No palpable 

organomegaly. 


MUSCULOSKELETAL: Left upper extremity sling seen


EXTREMITIES: No cyanosis, clubbing, or pedal edema. 


NEUROLOGICAL: Gross neurological examination did not reveal any focal deficits. 


SKIN: No rashes. 





Assessment and plan


Fall and diffuse contusions with left elbow pain 


Diffuse aches and pains


Gait dysfunction


History of atrial fibrillation


Asthma


Hypertension


Hyperlipidemia


History of pneumonia





Plan;


Monitor vital signs


Monitor CBC


Monitor CMP


Continue telemetry monitoring


PT and OT evaluation


Follow-up on cardiology recommendations


Follow up on orthopedic recommendations


Continue home meds





DVT prophylaxis:








Objective





- Vital Signs


Vital signs: 


                                   Vital Signs











Temp  97.5 F L  04/29/23 07:00


 


Pulse  56 L  04/29/23 08:00


 


Resp  16   04/29/23 08:00


 


BP  106/58   04/29/23 07:00


 


Pulse Ox  100   04/29/23 07:00


 


FiO2      








                                 Intake & Output











 04/28/23 04/29/23 04/29/23





 18:59 06:59 18:59


 


Intake Total 118  118


 


Balance 118  118


 


Weight 93.44 kg  


 


Intake:   


 


  Oral 118  118


 


Other:   


 


  Voiding Method  Toilet Toilet


 


  # Voids 3 0 














- Labs


CBC & Chem 7: 


                                 04/29/23 08:15





                                 04/29/23 08:15


Labs: 


                  Abnormal Lab Results - Last 24 Hours (Table)











  04/29/23 04/29/23 Range/Units





  08:15 08:15 


 


Hct   46.2 H  (34.0-46.0)  %


 


MCHC   30.9 L  (31.0-37.0)  g/dL


 


BUN  33 H   (7-17)  mg/dL


 


Glucose  114 H   (74-99)  mg/dL

## 2023-04-29 NOTE — P.CRDCN
History of Present Illness


History of present illness: 


HISTORY OF PRESENTING ILLNESS


This is a pleasant 68-year-old  female patient who sees Dr. Mata as 

an outpatient on a regular basis with a past medical history significant for 

paroxysmal atrial fibrillation, obesity, and hypertension.





Currently the patient is in normal sinus mechanism and she is asymptomatic.  She

was restarted back on the beta blocker as well as Rythmol.  She is also on oral 

anticoagulation.Patient states she was moving houses and had a pet dog that 

pulled on their leash causing her to fall and had a mechanical fall.  She has 

had extensive chest pain, left elbow pain and left knee pain since then.  She 

denies any actual syncope or lightheadedness.  She was noted to be tachycardic 

on presentation however admits he was in significant amount of pain.  By the 

time EKG was performed that showed sinus rhythm with similar diffuse T-wave 

inversions V3 through the 6 similar to prior daily.  She denies any chest pain 

other than her reproducible left rib pain worse with inspiration and 

manipulation.  She has recently follow with Dr. Mata in denies any changes to 

medications.  She was feeling some palpitations however was restarted on her 

Rythmol is feeling better.  Not currently on any telemetry.





REVIEW OF SYSTEMS


At the time of my exam:


CONSTITUTIONAL: Denies fever or chills.


CARDIOVASCULAR: +reproducible chest pain, no shortness of breath, orthopnea, PND

or palpitations.


RESPIRATORY: Denies cough. 


GASTROINTESTINAL: Denies abdominal pain, diarrhea, constipation, nausea or 

vomiting.


MUSCULOSKELETAL: Denies myalgias.


NEUROLOGIC: Denies numbness, tingling or weakness.


ENDOCRINE: Denies fatigue, weight change,  polydipsia or polyurina.


GENITOURINARY: Denies burning, hematuria or urgency with micturation.


HEMATOLOGIC: Denies history of anemia or bleeding. 





PHYSICAL EXAMINATION


Vital signs reviewed.


CONSTITUTIONAL: No apparent distress. 


HEENT: Head is normocephalic. Pupils are equal, round. Sclerae anicteric. Mucous

membranes of the mouth are moist.  No JVD. No carotid bruit.


CHEST EXAMINATION: Lungs are clear to auscultation. +chest wall tenderness is 

noted on palpation 


HEART EXAMINATION: Regular rate and rhythm. S1, S2 heard. No murmurs, gallops or

rub.


ABDOMEN: Soft, nontender. Positive bowel sounds.


EXTREMITIES: 2+ peripheral pulses, no lower extremity edema and no calf 

tenderness.


NEUROLOGIC EXAMINATION: Patient is awake, alert and oriented x3. 





ASSESSMENT


1.  Status post mechanical fall


2.  Atypical chest pain, reproducible related to recent fall


3.  Paroxysmal atrial fibrillation, currently sinus rhythm


4.  Hypertension


5.  Obesity


6.  Elbow and knee pain





PLAN


Patient with mechanical fall and having musculoskeletal noncardiac chest pain.  

Currently she remains in A. fib and no significant arrhythmias noted.  If she is

going to stay would recommend telemetry if she has significant palpitations.  

Otherwise continue with home medications.  No need for any further inpatient 

workup.  Follow-up outpatient with Dr. Mata in 1-2 weeks.  Restart 

anticoagulation if no surgical intervention anticipated.











Past Medical History


Past Medical History: Atrial Fibrillation, Asthma, Cancer, Chest Pain / Angina, 

GERD/Reflux, Hyperlipidemia, Hypertension, Osteoarthritis (OA), Pneumonia


Additional Past Medical History / Comment(s): varicose veins, hx ulcer, hiatal 

hernia, constipation, degenerative disk, itchy skin, diet control diabetic, hx 

anemia, "problem with immunity", skin cancer, abnormal vaginal bleeding


History of Any Multi-Drug Resistant Organisms: None Reported


Past Surgical History: Adenoidectomy, Appendectomy, Heart Catheterization, 

Tonsillectomy


Additional Past Surgical History / Comment(s): laser eye surgeries, laparoscopy 

x 3-4, cystoscopy, D&C's, colonoscopy


Past Anesthesia/Blood Transfusion Reactions: Previous Problems w/ Anesthesia, 

Motion Sickness, Postoperative Nausea & Vomiting (PONV)


Additional Past Anesthesia/Blood Transfusion Reaction / Comment(s): "told diff 

in recovery and had to bring me back" - not sure what surgery(with general 

anesthesia). takes a little longer for anesthesia to take affect and longer to 

come out


Past Psychological History: Anxiety, Depression


Smoking Status: Never smoker


Past Alcohol Use History: Rare


Past Drug Use History: None Reported


Additional Drug Use History / Comment(s): CBD lip balm





- Past Family History


  ** Father


Family Medical History: Cancer


Additional Family Medical History / Comment(s): grandmother had colon cancer





Medications and Allergies


                                Home Medications











 Medication  Instructions  Recorded  Confirmed  Type


 


Acyclovir 400 mg PO TID PRN 04/06/18 04/28/23 History


 


Propafenone [Rythmol] 225 mg PO Q8H #90 tab 04/08/18 04/28/23 Rx


 


Rivaroxaban [Xarelto] 20 mg PO DAILY 06/12/20 04/28/23 History


 


HYDROcodone/APAP 10-325MG [Norco 1 tab PO Q4HR PRN #18 tab 04/28/23  Rx





]    


 


Latanoprost/Pf [Latanoprost 0.005% 1 drop BOTH EYES HS 04/28/23 04/28/23 History





Eye Drop]    


 


hydroCHLOROthiazide 12.5 mg PO DAILY 04/28/23 04/28/23 History


 


lisinopriL [Prinivil] 10 mg PO DAILY 04/28/23 04/28/23 History








                                    Allergies











Allergy/AdvReac Type Severity Reaction Status Date / Time


 


azithromycin [From Zithromax] Allergy  Unknown Verified 04/28/23 08:55


 


ciprofloxacin Allergy  Unknown Verified 04/28/23 08:55


 


fluoxetine Allergy  Unknown Verified 04/28/23 08:55


 


Iodinated Contrast Media Allergy  Rash/Hives Verified 04/28/23 08:55


 


iodine Allergy  Rash/Hives Verified 04/28/23 08:55


 


latex Allergy  Dyspnea Verified 04/28/23 08:55


 


Latex, Natural Rubber Allergy  Dyspnea Verified 04/28/23 08:55


 


paroxetine [From Paxil] Allergy  Unknown Verified 04/28/23 08:55


 


shellfish derived [Shellfish] Allergy  Unknown Verified 04/28/23 08:55


 


selective serotonin reuptake Allergy Unknown Dyspnea Uncoded 04/28/23 08:55





inhib.     














Physical Exam


Vitals: 


                                   Vital Signs











  Temp Pulse Resp BP Pulse Ox


 


 04/29/23 07:00  97.5 F L  56 L  16  106/58  100


 


 04/29/23 03:18  97.6 F  63  16  103/62  97


 


 04/28/23 18:48  98.2 F  66  16  128/66  98


 


 04/28/23 15:00  97.6 F  70  16  138/67  97


 


 04/28/23 10:44  97.6 F  68  16  111/63  97








                                Intake and Output











 04/28/23 04/29/23 04/29/23





 22:59 06:59 14:59


 


Intake Total   118


 


Balance   118


 


Intake:   


 


  Oral   118


 


Other:   


 


  Voiding Method Toilet  


 


  # Voids 1 0 














Results





                                 04/29/23 08:15





                                 04/29/23 08:15


                                 Cardiac Enzymes











  04/29/23 Range/Units





  08:15 


 


AST  20  (14-36)  U/L








                                       CBC











  04/29/23 Range/Units





  08:15 


 


WBC  5.6  (3.8-10.6)  k/uL


 


RBC  5.08  (3.80-5.40)  m/uL


 


Hgb  14.3  (11.4-16.0)  gm/dL


 


Hct  46.2 H  (34.0-46.0)  %


 


Plt Count  197  (150-450)  k/uL








                          Comprehensive Metabolic Panel











  04/29/23 Range/Units





  08:15 


 


Sodium  140  (137-145)  mmol/L


 


Potassium  4.7  (3.5-5.1)  mmol/L


 


Chloride  103  ()  mmol/L


 


Carbon Dioxide  29  (22-30)  mmol/L


 


BUN  33 H  (7-17)  mg/dL


 


Creatinine  0.75  (0.52-1.04)  mg/dL


 


Glucose  114 H  (74-99)  mg/dL


 


Calcium  9.3  (8.4-10.2)  mg/dL


 


AST  20  (14-36)  U/L


 


ALT  18  (4-34)  U/L


 


Alkaline Phosphatase  76  ()  U/L


 


Total Protein  6.5  (6.3-8.2)  g/dL


 


Albumin  3.7  (3.5-5.0)  g/dL








                               Current Medications











Generic Name Dose Route Start Last Admin





  Trade Name Freq  PRN Reason Stop Dose Admin


 


Hydrocodone Bitart/Acetaminophen  1 each  04/28/23 13:01  04/29/23 06:41





  Hydrocodone/Apap 5-325mg 1 Each Tab  PO   1 each





  Q6HR PRN   Administration





  Pain  


 


Acyclovir  400 mg  04/28/23 13:02 





  Acyclovir 200 Mg Cap  PO  





  TID PRN  





  Skin Irritation  


 


Folic Acid  1 mg  04/29/23 12:00 





  Folic Acid 1 Mg Tab  PO  





  DAILY@1200 LENIN  


 


Heparin Sodium (Porcine)  5,000 unit  04/28/23 21:00  04/29/23 09:07





  Heparin Sodium,Porcine/Pf 5,000 Unit/0.5 Ml Syringe  SQ   5,000 unit





  Q12HR LENIN   Administration


 


Hydrochlorothiazide  12.5 mg  04/28/23 14:45  04/29/23 09:15





  Hydrochlorothiazide 12.5 Mg Cap  PO   12.5 mg





  DAILY LENIN   Administration


 


Hydromorphone HCl  0.5 mg  04/28/23 13:01 





  Hydromorphone 0.5 Mg/0.5 Ml Syringe  IVP  





  Q6HR PRN  





  Severe Pain (Scale 7 to 10)  


 


Latanoprost  1 drops  04/28/23 21:00  04/28/23 20:29





  Latanoprost 0.005% Ophth Drops 2.5 Ml Btl  BOTH EYES   1 drops





  HS LENIN   Administration


 


Lisinopril  10 mg  04/29/23 09:00  04/29/23 09:07





  Lisinopril 10 Mg Tab  PO   10 mg





  DAILY [facility]   Administration


 


Multivitamins  1 each  04/29/23 12:00 





  Multivitamins, Thera 1 Each Tab  PO  





  DAILY@1200 [facility]  


 


Naloxone HCl  0.2 mg  04/28/23 08:16 





  Naloxone 0.4 Mg/Ml 1 Ml Vial  IV  





  Q2M PRN  





  Opioid Reversal  


 


Nystatin  1 applic  04/28/23 11:30  04/29/23 09:15





  Nystatin 100,000 Unit/Gm Powd 15 Gm  TOPICAL   1 applic





  BID [facility]   Administration





  Protocol  


 


Pantoprazole Sodium  40 mg  04/29/23 07:30  04/29/23 06:39





  Pantoprazole 40 Mg Tablet  PO   40 mg





  AC-[id number] LENIN   Administration


 


Propafenone HCl  225 mg  04/28/23 16:00  04/29/23 09:15





  Propafenone 225 Mg Tab  PO   225 mg





  Q8HR LENIN   Administration


 


Thiamine HCl  100 mg  04/28/23 17:30  04/29/23 06:39





  Thiamine 100 Mg Tab  PO   100 mg





  BID-W/MEALS [facility]   Administration








                                Intake and Output











 04/28/23 04/29/23 04/29/23





 22:59 06:59 14:59


 


Intake Total   118


 


Balance   118


 


Intake:   


 


  Oral   118


 


Other:   


 


  Voiding Method Toilet  


 


  # Voids 1 0 








                                        





                                 04/29/23 08:15 





                                 04/29/23 08:15

## 2023-04-30 RX ADMIN — HEPARIN SODIUM SCH UNIT: 5000 INJECTION INTRAVENOUS; SUBCUTANEOUS at 20:06

## 2023-04-30 RX ADMIN — Medication SCH MG: at 06:16

## 2023-04-30 RX ADMIN — FOLIC ACID SCH MG: 1 TABLET ORAL at 12:12

## 2023-04-30 RX ADMIN — HYDROCODONE BITARTRATE AND ACETAMINOPHEN PRN EACH: 5; 325 TABLET ORAL at 20:07

## 2023-04-30 RX ADMIN — NYSTATIN SCH APPLIC: 100000 POWDER TOPICAL at 12:13

## 2023-04-30 RX ADMIN — HYDROCODONE BITARTRATE AND ACETAMINOPHEN PRN EACH: 5; 325 TABLET ORAL at 00:32

## 2023-04-30 RX ADMIN — THERA TABS SCH EACH: TAB at 12:13

## 2023-04-30 RX ADMIN — PANTOPRAZOLE SODIUM SCH MG: 40 TABLET, DELAYED RELEASE ORAL at 06:16

## 2023-04-30 RX ADMIN — HYDROCODONE BITARTRATE AND ACETAMINOPHEN PRN EACH: 5; 325 TABLET ORAL at 09:36

## 2023-04-30 RX ADMIN — Medication SCH MG: at 17:17

## 2023-04-30 RX ADMIN — PROPAFENONE HYDROCHLORIDE SCH MG: 225 TABLET, FILM COATED ORAL at 06:16

## 2023-04-30 RX ADMIN — HEPARIN SODIUM SCH UNIT: 5000 INJECTION INTRAVENOUS; SUBCUTANEOUS at 08:54

## 2023-04-30 RX ADMIN — PROPAFENONE HYDROCHLORIDE SCH MG: 225 TABLET, FILM COATED ORAL at 17:17

## 2023-04-30 RX ADMIN — LATANOPROST SCH DROPS: 50 SOLUTION OPHTHALMIC at 20:07

## 2023-04-30 RX ADMIN — NYSTATIN SCH APPLIC: 100000 POWDER TOPICAL at 20:06

## 2023-04-30 RX ADMIN — PROPAFENONE HYDROCHLORIDE SCH MG: 225 TABLET, FILM COATED ORAL at 00:03

## 2023-04-30 NOTE — P.PN
Subjective


Progress Note Date: 04/30/23


 This is a pleasant 68-year-old  female  with a past medical history 

significant for paroxysmal atrial fibrillation, obesity, and 

hypertension.Patient states she was moving houses and had a pet dog that pulled 

on their leash causing her to fall and had a mechanical fall.  She has had 

extensive chest pain, left elbow pain and left knee pain since then.  She denies

any actual syncope or lightheadedness.  She was noted to be tachycardic on 

presentation however admits he was in significant amount of pain.  By the time 

EKG was performed that showed sinus rhythm with similar diffuse T-wave inversi

ons V3 through the 6 similar to prior daily.  She denies any chest pain other 

than her reproducible left rib pain worse with inspiration and manipulation.  

She has recently follow with Dr. Mata in denies any changes to medications.  

She was feeling some palpitations however was restarted on her Rythmol is 

feeling better.  Not currently on any telemetry





4/29.  Patient seen and examined.  Still complaining of left elbow and shoulder 

pain





4/30.  Patient seen and examined in no acute issues overnight.  Still has left 

elbow pain.  Denies any lightheadedness or dizziness





REVIEW OF SYSTEMS: 


CONSTITUTIONAL: No fever, no malaise,. 


CARDIOVASCULAR: No chest pain, no palpitations, no syncope. 


PULMONARY: No shortness of breath, no cough, 


GASTROINTESTINAL: No diarrhea, no nausea, no vomiting, no abdominal pain. 


NEUROLOGICAL: No headaches, no weakness, 





PHYSICAL EXAMINATION: 





GENERAL: The patient is alert and oriented x3, not in any acute distress. Well 

developed, well nourished. 


HEENT: Pupils are round and equally reacting to light. EOMI. No scleral icterus.

No conjunctival pallor. Normocephalic, atraumatic. No pharyngeal erythema. No 

thyromegaly. 


CARDIOVASCULAR: S1 and S2 present. No murmurs, rubs, or gallops. 


PULMONARY: Chest is clear to auscultation, no wheezing or crackles. 


ABDOMEN: Soft, nontender, nondistended, normoactive bowel sounds. No palpable o

rganomegaly. 


MUSCULOSKELETAL: Left upper extremity sling seen


EXTREMITIES: No cyanosis, clubbing, or pedal edema. 


NEUROLOGICAL: Gross neurological examination did not reveal any focal deficits. 


SKIN: No rashes. 





Assessment and plan


Fall and diffuse contusions with left elbow pain 


Diffuse aches and pains


Gait dysfunction


History of atrial fibrillation


Asthma


Hypertension


Hyperlipidemia


History of pneumonia





Plan;


Monitor vital signs


Monitor CBC


Monitor CMP


Continue telemetry monitoring


PT and OT evaluation


Follow-up on cardiology recommendations


Follow up on orthopedic recommendations


Continue home meds





DVT prophylaxis:








Objective





- Vital Signs


Vital signs: 


                                   Vital Signs











Temp  97.8 F   04/30/23 07:00


 


Pulse  54 L  04/30/23 08:00


 


Resp  16   04/30/23 08:00


 


BP  132/81   04/30/23 07:00


 


Pulse Ox  97   04/30/23 07:00


 


FiO2      








                                 Intake & Output











 04/29/23 04/30/23 04/30/23





 18:59 06:59 18:59


 


Intake Total 236  


 


Balance 236  


 


Intake:   


 


  Oral 236  


 


Other:   


 


  Voiding Method Toilet Toilet Toilet


 


  # Voids 3 2 














- Labs


CBC & Chem 7: 


                                 04/29/23 08:15





                                 04/29/23 08:15

## 2023-05-01 VITALS — HEART RATE: 65 BPM | SYSTOLIC BLOOD PRESSURE: 129 MMHG | TEMPERATURE: 98.3 F | DIASTOLIC BLOOD PRESSURE: 60 MMHG

## 2023-05-01 VITALS — RESPIRATION RATE: 16 BRPM

## 2023-05-01 RX ADMIN — PROPAFENONE HYDROCHLORIDE SCH MG: 225 TABLET, FILM COATED ORAL at 00:13

## 2023-05-01 RX ADMIN — NYSTATIN SCH APPLIC: 100000 POWDER TOPICAL at 10:37

## 2023-05-01 RX ADMIN — HYDROCODONE BITARTRATE AND ACETAMINOPHEN PRN EACH: 5; 325 TABLET ORAL at 05:44

## 2023-05-01 RX ADMIN — Medication SCH MG: at 05:44

## 2023-05-01 RX ADMIN — PROPAFENONE HYDROCHLORIDE SCH MG: 225 TABLET, FILM COATED ORAL at 17:16

## 2023-05-01 RX ADMIN — PROPAFENONE HYDROCHLORIDE SCH MG: 225 TABLET, FILM COATED ORAL at 05:44

## 2023-05-01 RX ADMIN — THERA TABS SCH EACH: TAB at 14:28

## 2023-05-01 RX ADMIN — HYDROCODONE BITARTRATE AND ACETAMINOPHEN PRN EACH: 5; 325 TABLET ORAL at 14:28

## 2023-05-01 RX ADMIN — HEPARIN SODIUM SCH UNIT: 5000 INJECTION INTRAVENOUS; SUBCUTANEOUS at 09:00

## 2023-05-01 RX ADMIN — FOLIC ACID SCH MG: 1 TABLET ORAL at 14:28

## 2023-05-01 RX ADMIN — Medication SCH MG: at 17:16

## 2023-05-01 RX ADMIN — PANTOPRAZOLE SODIUM SCH MG: 40 TABLET, DELAYED RELEASE ORAL at 05:43

## 2023-05-04 NOTE — P.DS
Providers


Date of admission: 


04/28/23 08:17





Expected date of discharge: 05/01/23


Attending physician: 


Emeka Holm MD





Consults: 





                                        





04/28/23 08:16


Consult Physician Urgent 


   Consulting Provider: Cipriano Escalante


   Consult Reason/Comments: left elbow pain s/p fall


   Do you want consulting provider notified?: Yes











Primary care physician: 


Nadja Ramey





Hospital Course: 











Final diagnosis





Fall and diffuse contusions with left elbow pain 


Diffuse aches and pains


Gait dysfunction


History of atrial fibrillation


Asthma


Hypertension


Hyperlipidemia


Obesity with BMI of 36.5








Discharge disposition


Patient is being discharged in a stable condition with guarded prognosis to home

with home care.  Patient will follow-up with Dr. Ramey in the outpatient 

setting upon discharge.  Patient is to follow-up with orthopedics outpatient as 

scheduled.  Total time taken is greater than 35 minutes.





Hospital course


This is a 73-year-old female who was recently admitted with generalized pain and

follow with left elbow pain and chest pain.  Patient underwent mechanical after 

being tripped by her dog and had elbow pain.  Patient was evaluated by 

orthopedics recommending conservative management and swelling with no plans of 

surgical intervention.  No fractures noted.  Patient reporting significant 

weakness and requesting to go to rehab hospital patient was evaluated by 

physical therapy and would not qualify for rehab unless privately paying.  

Patient has been cleared by consultations.  Please refer to other consultation 

notes for further HPI. Currently no reports of chest pain, shortness of breath, 

or palpitations.  Patient is afebrile.  No reports of nausea or vomiting and 

patient is tolerating diet.  Patient will be discharged home  today.





Physical exam:








Gen: This is a 73-year-old female who is awake, alert and oriented 3, well-

developed, well-nourished, obese


HEENT: Head is atraumatic, normocephalic. Pupils equal, round. Sclerae is 

anicteric. 


NECK: Supple. No JVD. No lymphadenopathy. No thyromegaly. 


LUNGS: Clear to auscultation. No wheezes or rhonchi.  No intercostal 

retractions.


HEART: Regular rate and rhythm. No murmur. 


ABDOMEN: Soft. Bowel sounds are present. No masses.  No tenderness.


EXTREMITIES: No pedal edema.  No calf tenderness.  Left upper extremity sling 

currently


NEUROLOGICAL: Patient is awake, alert and oriented x3. Cranial nerves 2 through 

12 are grossly intact. 





Please refer to medication reconciliation sheet for a list of medications.





The impression and plan of care has been dictated by Tonie Gale, Nurse 

Practitioner as directed.





Dr. Anita MD


I have performed a history and examination and MDM of this patient, discussed 

the same with the dictator, and  agree with the dictator's assessment and plan 

as written ,documented as a scribe. Based on total visit time,  I have performed

more than 50% of the visit.  


Patient Condition at Discharge: Stable





Plan - Discharge Summary


Discharge Rx Participant: Yes


New Discharge Prescriptions: 


New


   Multivitamins, Thera [Multivitamin] 1 tab PO DAILY #30 tablet


   Folic Acid 1 mg PO DAILY #30 tablet


   HYDROcodone/APAP 5-325MG [Norco 5-325] 1 each PO Q6HR PRN #9 tab


     PRN Reason: Pain


   Thiamine [Vitamin B-1] 100 mg PO DAILY #30 tablet





Continue


   Acyclovir 400 mg PO TID PRN


     PRN Reason: Skin Irritation


   Propafenone [Rythmol] 225 mg PO Q8H 30 Days #90 tab


   Rivaroxaban [Xarelto] 20 mg PO DAILY #30 tab


   hydroCHLOROthiazide 12.5 mg PO DAILY #30 cap


   Latanoprost/Pf [Latanoprost 0.005% Eye Drop] 1 drop BOTH EYES HS #5 ml


   lisinopriL [Prinivil] 10 mg PO DAILY #30 tab


Discharge Medication List





Acyclovir 400 mg PO TID PRN 04/06/18 [History]


Folic Acid 1 mg PO DAILY #30 tablet 05/01/23 [Rx]


HYDROcodone/APAP 5-325MG [Coleman 5-325] 1 each PO Q6HR PRN #9 tab 05/01/23 [Rx]


Latanoprost/Pf [Latanoprost 0.005% Eye Drop] 1 drop BOTH EYES HS #5 ml 05/01/23 

[Rx]


Multivitamins, Thera [Multivitamin] 1 tab PO DAILY #30 tablet 05/01/23 [Rx]


Propafenone [Rythmol] 225 mg PO Q8H 30 Days #90 tab 05/01/23 [Rx]


Rivaroxaban [Xarelto] 20 mg PO DAILY #30 tab 05/01/23 [Rx]


Thiamine [Vitamin B-1] 100 mg PO DAILY #30 tablet 05/01/23 [Rx]


hydroCHLOROthiazide 12.5 mg PO DAILY #30 cap 05/01/23 [Rx]


lisinopriL [Prinivil] 10 mg PO DAILY #30 tab 05/01/23 [Rx]








Follow up Appointment(s)/Referral(s): 


Nadja Ramey MD [Primary Care Provider] - 1-2 days


Cipriano Escalante MD [Medical Doctor] - 05/04/23 9:15 am


Patient Instructions/Handouts:  Fall Prevention for Older Adults (ED), Contusion

in Adults (DC)


Activity/Diet/Wound Care/Special Instructions: 


Wear the sling for comfort.  Take pain medications as needed.  Follow-up with 

the orthopedic specialist and have repeat x-rays performed if you continue to 

have pain as there may be a subtle fracture not originally picked up





Activity Limited until follow-up


Follow-up with primary care provider on discharge


Follow-up with orthopedics outpatient


Continue using a sling for now





Discharge Disposition: HOME WITH HOME HEALTH SERVICES

## 2023-07-13 ENCOUNTER — HOSPITAL ENCOUNTER (INPATIENT)
Dept: HOSPITAL 47 - EC | Age: 73
LOS: 1 days | Discharge: HOME | DRG: 309 | End: 2023-07-14
Attending: HOSPITALIST | Admitting: HOSPITALIST
Payer: MEDICARE

## 2023-07-13 VITALS — RESPIRATION RATE: 18 BRPM

## 2023-07-13 DIAGNOSIS — Z91.040: ICD-10-CM

## 2023-07-13 DIAGNOSIS — E78.5: ICD-10-CM

## 2023-07-13 DIAGNOSIS — Z88.8: ICD-10-CM

## 2023-07-13 DIAGNOSIS — Z91.013: ICD-10-CM

## 2023-07-13 DIAGNOSIS — M19.90: ICD-10-CM

## 2023-07-13 DIAGNOSIS — Z79.01: ICD-10-CM

## 2023-07-13 DIAGNOSIS — F41.9: ICD-10-CM

## 2023-07-13 DIAGNOSIS — Z79.899: ICD-10-CM

## 2023-07-13 DIAGNOSIS — I20.0: ICD-10-CM

## 2023-07-13 DIAGNOSIS — E11.9: ICD-10-CM

## 2023-07-13 DIAGNOSIS — K59.00: ICD-10-CM

## 2023-07-13 DIAGNOSIS — K44.9: ICD-10-CM

## 2023-07-13 DIAGNOSIS — Z91.041: ICD-10-CM

## 2023-07-13 DIAGNOSIS — Z91.148: ICD-10-CM

## 2023-07-13 DIAGNOSIS — J43.9: ICD-10-CM

## 2023-07-13 DIAGNOSIS — I48.0: Primary | ICD-10-CM

## 2023-07-13 DIAGNOSIS — I83.90: ICD-10-CM

## 2023-07-13 DIAGNOSIS — T50.906A: ICD-10-CM

## 2023-07-13 DIAGNOSIS — Z88.1: ICD-10-CM

## 2023-07-13 DIAGNOSIS — K21.9: ICD-10-CM

## 2023-07-13 DIAGNOSIS — I10: ICD-10-CM

## 2023-07-13 DIAGNOSIS — Z85.828: ICD-10-CM

## 2023-07-13 DIAGNOSIS — I95.9: ICD-10-CM

## 2023-07-13 DIAGNOSIS — Z91.128: ICD-10-CM

## 2023-07-13 DIAGNOSIS — F32.A: ICD-10-CM

## 2023-07-13 LAB
ALBUMIN SERPL-MCNC: 4.2 G/DL (ref 3.5–5)
ALP SERPL-CCNC: 97 U/L (ref 38–126)
ALT SERPL-CCNC: 21 U/L (ref 4–34)
ANION GAP SERPL CALC-SCNC: 8 MMOL/L
APTT BLD: 25.8 SEC (ref 22–30)
AST SERPL-CCNC: 25 U/L (ref 14–36)
BASOPHILS # BLD AUTO: 0 K/UL (ref 0–0.2)
BASOPHILS NFR BLD AUTO: 1 %
BUN SERPL-SCNC: 37 MG/DL (ref 7–17)
CALCIUM SPEC-MCNC: 10 MG/DL (ref 8.4–10.2)
CHLORIDE SERPL-SCNC: 105 MMOL/L (ref 98–107)
CO2 SERPL-SCNC: 27 MMOL/L (ref 22–30)
EOSINOPHIL # BLD AUTO: 0.2 K/UL (ref 0–0.7)
EOSINOPHIL NFR BLD AUTO: 2 %
ERYTHROCYTE [DISTWIDTH] IN BLOOD BY AUTOMATED COUNT: 5.59 M/UL (ref 3.8–5.4)
ERYTHROCYTE [DISTWIDTH] IN BLOOD: 13.6 % (ref 11.5–15.5)
GLUCOSE SERPL-MCNC: 128 MG/DL (ref 74–99)
HCT VFR BLD AUTO: 49.4 % (ref 34–46)
HGB BLD-MCNC: 15.6 GM/DL (ref 11.4–16)
INR PPP: 1 (ref ?–1.2)
LYMPHOCYTES # SPEC AUTO: 3.1 K/UL (ref 1–4.8)
LYMPHOCYTES NFR SPEC AUTO: 41 %
MAGNESIUM SPEC-SCNC: 1.9 MG/DL (ref 1.6–2.3)
MCH RBC QN AUTO: 28 PG (ref 25–35)
MCHC RBC AUTO-ENTMCNC: 31.6 G/DL (ref 31–37)
MCV RBC AUTO: 88.4 FL (ref 80–100)
MONOCYTES # BLD AUTO: 0.6 K/UL (ref 0–1)
MONOCYTES NFR BLD AUTO: 7 %
NEUTROPHILS # BLD AUTO: 3.5 K/UL (ref 1.3–7.7)
NEUTROPHILS NFR BLD AUTO: 46 %
PLATELET # BLD AUTO: 261 K/UL (ref 150–450)
POTASSIUM SERPL-SCNC: 4.1 MMOL/L (ref 3.5–5.1)
PROT SERPL-MCNC: 7.2 G/DL (ref 6.3–8.2)
PT BLD: 10.5 SEC (ref 9–12)
SODIUM SERPL-SCNC: 140 MMOL/L (ref 137–145)
WBC # BLD AUTO: 7.6 K/UL (ref 3.8–10.6)

## 2023-07-13 PROCEDURE — 99291 CRITICAL CARE FIRST HOUR: CPT

## 2023-07-13 PROCEDURE — 93005 ELECTROCARDIOGRAM TRACING: CPT

## 2023-07-13 PROCEDURE — 85025 COMPLETE CBC W/AUTO DIFF WBC: CPT

## 2023-07-13 PROCEDURE — 96374 THER/PROPH/DIAG INJ IV PUSH: CPT

## 2023-07-13 PROCEDURE — 71045 X-RAY EXAM CHEST 1 VIEW: CPT

## 2023-07-13 PROCEDURE — 36415 COLL VENOUS BLD VENIPUNCTURE: CPT

## 2023-07-13 PROCEDURE — 83735 ASSAY OF MAGNESIUM: CPT

## 2023-07-13 PROCEDURE — 84484 ASSAY OF TROPONIN QUANT: CPT

## 2023-07-13 PROCEDURE — 85730 THROMBOPLASTIN TIME PARTIAL: CPT

## 2023-07-13 PROCEDURE — 83880 ASSAY OF NATRIURETIC PEPTIDE: CPT

## 2023-07-13 PROCEDURE — 80061 LIPID PANEL: CPT

## 2023-07-13 PROCEDURE — 80053 COMPREHEN METABOLIC PANEL: CPT

## 2023-07-13 PROCEDURE — 85610 PROTHROMBIN TIME: CPT

## 2023-07-13 RX ADMIN — PROPAFENONE HYDROCHLORIDE SCH MG: 225 TABLET, FILM COATED ORAL at 17:33

## 2023-07-13 RX ADMIN — METOPROLOL TARTRATE SCH MG: 25 TABLET, FILM COATED ORAL at 21:10

## 2023-07-13 RX ADMIN — DILTIAZEM HYDROCHLORIDE SCH MLS/HR: 5 INJECTION INTRAVENOUS at 07:09

## 2023-07-13 RX ADMIN — METOPROLOL TARTRATE SCH MG: 25 TABLET, FILM COATED ORAL at 09:37

## 2023-07-13 RX ADMIN — ACETAMINOPHEN PRN MG: 325 TABLET, FILM COATED ORAL at 21:10

## 2023-07-13 RX ADMIN — RIVAROXABAN SCH MG: 20 TABLET, FILM COATED ORAL at 09:38

## 2023-07-13 RX ADMIN — PROPAFENONE HYDROCHLORIDE SCH MG: 225 TABLET, FILM COATED ORAL at 23:43

## 2023-07-13 NOTE — ED
Arrhythmia/Palpitations HPI





- General


Chief Complaint: Arrhythmia/Palpitations


Stated Complaint: Chest Pressure


Time Seen by Provider: 07/13/23 06:20


Source: patient, RN notes reviewed


Mode of arrival: ambulatory


Limitations: no limitations





- History of Present Illness


Initial Comments: 


73-year-old female presents emergency Department with chief complaint of not 

feeling well.  Patient states she is missing her medications at recent presented

emergency department she developed some chest pain, shortness breath left arm 

pain.  Patient has a history of atrial fibrillation and is on Xarelto.  Patient 

denies any leg pain or swelling patient states that she felt lightheaded, dizzy.

 Patient denies any fevers or chills.








- Related Data


                                Home Medications











 Medication  Instructions  Recorded  Confirmed


 


Acyclovir 400 mg PO TID PRN 04/06/18 04/28/23








                                  Previous Rx's











 Medication  Instructions  Recorded


 


Folic Acid 1 mg PO DAILY #30 tablet 05/01/23


 


HYDROcodone/APAP 5-325MG [Norco 1 each PO Q6HR PRN #9 tab 05/01/23





5-325]  


 


Latanoprost/Pf [Latanoprost 0.005% 1 drop BOTH EYES HS #5 ml 05/01/23





Eye Drop]  


 


Multivitamins, Thera [Multivitamin] 1 tab PO DAILY #30 tablet 05/01/23


 


Propafenone [Rythmol] 225 mg PO Q8H 30 Days #90 tab 05/01/23


 


Rivaroxaban [Xarelto] 20 mg PO DAILY #30 tab 05/01/23


 


Thiamine [Vitamin B-1] 100 mg PO DAILY #30 tablet 05/01/23


 


hydroCHLOROthiazide 12.5 mg PO DAILY #30 cap 05/01/23


 


lisinopriL [Prinivil] 10 mg PO DAILY #30 tab 05/01/23











                                    Allergies











Allergy/AdvReac Type Severity Reaction Status Date / Time


 


azithromycin [From Zithromax] Allergy  Rash/Hives Verified 07/13/23 06:29


 


ciprofloxacin Allergy  Rash/Hives Verified 07/13/23 06:29


 


fluoxetine Allergy  Unknown Verified 07/13/23 06:29


 


Iodinated Contrast Media Allergy  Rash/Hives Verified 07/13/23 06:29


 


iodine Allergy  Rash/Hives Verified 07/13/23 06:29


 


latex Allergy  Dyspnea Verified 07/13/23 06:29


 


Latex, Natural Rubber Allergy  Dyspnea Verified 07/13/23 06:29


 


paroxetine [From Paxil] Allergy  Unknown Verified 07/13/23 06:29


 


shellfish derived [Shellfish] Allergy  Unknown Verified 07/13/23 06:29


 


selective serotonin reuptake Allergy Unknown Dyspnea Uncoded 07/13/23 06:29





inhib.     














Review of Systems


ROS Statement: 


Those systems with pertinent positive or pertinent negative responses have been 

documented in the HPI.





ROS Other: All systems not noted in ROS Statement are negative.





Past Medical History


Past Medical History: Atrial Fibrillation, Asthma, Cancer, Chest Pain / Angina, 

GERD/Reflux, Hyperlipidemia, Hypertension, Osteoarthritis (OA), Pneumonia


Additional Past Medical History / Comment(s): varicose veins, hx ulcer, hiatal 

hernia, constipation, degenerative disk, itchy skin, diet control diabetic, hx 

anemia, "problem with immunity", skin cancer, abnormal vaginal bleeding


History of Any Multi-Drug Resistant Organisms: None Reported


Past Surgical History: Adenoidectomy, Appendectomy, Heart Catheterization, 

Tonsillectomy


Additional Past Surgical History / Comment(s): laser eye surgeries, laparoscopy 

x 3-4, cystoscopy, D&C's, colonoscopy


Past Anesthesia/Blood Transfusion Reactions: Previous Problems w/ Anesthesia, 

Motion Sickness, Postoperative Nausea & Vomiting (PONV)


Additional Past Anesthesia/Blood Transfusion Reaction / Comment(s): "told diff 

in recovery and had to bring me back" - not sure what surgery(with general 

anesthesia). takes a little longer for anesthesia to take affect and longer to 

come out


Past Psychological History: Anxiety, Depression


Smoking Status: Never smoker


Past Alcohol Use History: None Reported, Rare


Past Drug Use History: None Reported





- Past Family History


  ** Father


Family Medical History: Cancer


Additional Family Medical History / Comment(s): grandmother had colon cancer





General Exam


Limitations: no limitations


General appearance: alert, in no apparent distress


Head exam: Present: atraumatic, normocephalic, normal inspection


Eye exam: Present: normal appearance, PERRL, EOMI.  Absent: scleral icterus, 

conjunctival injection, periorbital swelling


ENT exam: Present: normal exam, mucous membranes moist


Neck exam: Present: normal inspection, full ROM.  Absent: tenderness, men

ingismus, lymphadenopathy


Respiratory exam: Present: normal lung sounds bilaterally.  Absent: respiratory 

distress, wheezes, rales, rhonchi, stridor


Cardiovascular Exam: Present: tachycardia, irregular rhythm, normal heart 

sounds.  Absent: systolic murmur, diastolic murmur, rubs, gallop, clicks


GI/Abdominal exam: Present: soft, normal bowel sounds.  Absent: distended, 

tenderness, guarding, rebound, rigid





Course


                                   Vital Signs











  07/13/23 07/13/23





  06:18 07:11


 


Temperature 97.8 F 


 


Pulse Rate 146 H 116 H


 


Respiratory 16 18





Rate  


 


Blood Pressure 123/100 107/53


 


O2 Sat by Pulse 98 95





Oximetry  














EKG Findings





- EKG Comments:


EKG Findings:: EKG performed at 6.2 A. fib with RVR rate of 141  QT/QTC 

303/385





- EKG Results:


EKG: interpreted by LUDA





Medical Decision Making





- Medical Decision Making


Was pt. sent in by a medical professional or institution (, PA, NP, urgent 

care, hospital, or nursing home...) When possible be specific


@  -No


Did you speak to anyone other than the patient for history (EMS, parent, family,

police, friend...)? What history was obtained from this source 


@  -No


Did you review nursing and triage notes (agree or disagree)?  Why? 


@  -I reviewed and agree with nursing and triage notes


Were old charts reviewed (outside hosp., previous admission, EMS record, old 

EKG, old radiological studies, urgent care reports/EKG's, nursing home records)?

Report findings 


@  -No old charts were reviewed


Differential Diagnosis (chest pain, altered mental status, abdominal pain women,

abdominal pain men, vaginal bleeding, weakness, fever, dyspnea, syncope, 

headache, dizziness, GI bleed, back pain, seizure, CVA, palpatations, mental 

health, musculoskeletal)? 


@  -nDifferential Palpitations


Ventricular arrhythmias, atrial arrhythmias, myocardial infarction, anemia, 

thyrotoxicosis, electrolyte imbalance, hypokalemia, pulmonary embolism, 

pulmonary disease, drugs, alcohol, anxiety, stress....


This is not meant to be an all-inclusive list.able


EKG interpreted by me (3pts min.).


@  -As above


X-rays interpreted by me (1pt min.).


@  -Chest x-ray showed no acute Department process


CT interpreted by me (1pt min.).


@  -None done


U/S interpreted by me (1pt. min.).


@  -None done


What testing was considered but not performed or refused? (CT, X-rays, U/S, 

labs)? Why?


@  -None


What meds were considered but not given or refused? Why?


@  -None


Did you discuss the management of the patient with other professionals 

(professionals i.e. , PA, NP, lab, RT, psych nurse, , , 

teacher, , )? Give summary


@  -Tonie with [facility] for admission given patient's atrial fibrillation and RVR 

and troponin is negative patient will cardiology consult.


Was smoking cessation discussed for >3mins.?


@  -No


Was critical care preformed (if so, how long)?


@  -35 mins


Were there social determinants of health that impacted care today? How? 

(Homelessness, low income, unemployed, alcoholism, drug addiction, 

transportation, low edu. Level, literacy, decrease access med. care, long-term, 

rehab)?


@  -No


Was there de-escalation of care discussed even if they declined (Discuss DNR or 

withdrawal of care, Hospice)? DNR status


@  -No


What co-morbidities impacted this encounter? (DM, HTN, Smoking, COPD, CAD, 

Cancer, CVA, ARF, Chemo, Hep., AIDS, mental health diagnosis, sleep apnea, 

morbid obesity)?


@  -A. fib


Was patient admitted / discharged? Hospital course, mention meds given and 

route, prescriptions, significant lab abnormalities, going to OR and other 

pertinent info.


@  -Admitted patient's found to be in A. fib RVR patient was started on Cardizem

infusion after Cardizem bolus.  Initial troponin, chest x-ray and remained 

temperature is unremarkable.  Patient will be admitted for further treatment, 

management and cardiology evaluation. 


Undiagnosed new problem with uncertain prognosis?


@  -No


Drug Therapy requiring intensive monitoring for toxicity (Heparin, Nitro, 

Insulin, Cardizem)?


@  -Cardizem


Were any procedures done?


@  -No


Diagnosis/symptom?


@  -A. fib RVR, chest pain


Acute, or Chronic, or Acute on Chronic?


@  -Acute


Uncomplicated (without systemic symptoms) or Complicated (systemic symptoms)?


@  -, Complicated


Side effects of treatment?


@  -No]


Exacerbation, Progression, or Severe Exacerbation?


@  -No


Poses a threat to life or bodily function? How? (Chest pain, USA, MI, pneumonia,

PE, COPD, DKA, ARF, appy, cholecystitis, CVA, Diverticulitis, Homicidal, 

Suicidal, threat to staff... and all critical care pts)


@  -Yes patient has chest pain, cardiac arrhythmia may lead to cardiac arrest








- Lab Data


Result diagrams: 


                                 07/13/23 06:30





                                 07/13/23 06:30


                                   Lab Results











  07/13/23 07/13/23 07/13/23 Range/Units





  06:30 06:30 06:30 


 


WBC  7.6    (3.8-10.6)  k/uL


 


RBC  5.59 H    (3.80-5.40)  m/uL


 


Hgb  15.6    (11.4-16.0)  gm/dL


 


Hct  49.4 H    (34.0-46.0)  %


 


MCV  88.4    (80.0-100.0)  fL


 


MCH  28.0    (25.0-35.0)  pg


 


MCHC  31.6    (31.0-37.0)  g/dL


 


RDW  13.6    (11.5-15.5)  %


 


Plt Count  261    (150-450)  k/uL


 


MPV  8.5    


 


Neutrophils %  46    %


 


Lymphocytes %  41    %


 


Monocytes %  7    %


 


Eosinophils %  2    %


 


Basophils %  1    %


 


Neutrophils #  3.5    (1.3-7.7)  k/uL


 


Lymphocytes #  3.1    (1.0-4.8)  k/uL


 


Monocytes #  0.6    (0-1.0)  k/uL


 


Eosinophils #  0.2    (0-0.7)  k/uL


 


Basophils #  0.0    (0-0.2)  k/uL


 


PT   10.5   (9.0-12.0)  sec


 


INR   1.0   (<1.2)  


 


APTT   25.8   (22.0-30.0)  sec


 


Sodium    140  (137-145)  mmol/L


 


Potassium    4.1  (3.5-5.1)  mmol/L


 


Chloride    105  ()  mmol/L


 


Carbon Dioxide    27  (22-30)  mmol/L


 


Anion Gap    8  mmol/L


 


BUN    37 H  (7-17)  mg/dL


 


Creatinine    0.80  (0.52-1.04)  mg/dL


 


Est GFR (CKD-EPI)AfAm    85  (>60 ml/min/1.73 sqM)  


 


Est GFR (CKD-EPI)NonAf    74  (>60 ml/min/1.73 sqM)  


 


Glucose    128 H  (74-99)  mg/dL


 


Calcium    10.0  (8.4-10.2)  mg/dL


 


Magnesium    1.9  (1.6-2.3)  mg/dL


 


Total Bilirubin    0.6  (0.2-1.3)  mg/dL


 


AST    25  (14-36)  U/L


 


ALT    21  (4-34)  U/L


 


Alkaline Phosphatase    97  ()  U/L


 


Troponin I     (0.000-0.034)  ng/mL


 


NT-Pro-B Natriuret Pep     pg/mL


 


Total Protein    7.2  (6.3-8.2)  g/dL


 


Albumin    4.2  (3.5-5.0)  g/dL














  07/13/23 07/13/23 Range/Units





  06:30 06:30 


 


WBC    (3.8-10.6)  k/uL


 


RBC    (3.80-5.40)  m/uL


 


Hgb    (11.4-16.0)  gm/dL


 


Hct    (34.0-46.0)  %


 


MCV    (80.0-100.0)  fL


 


MCH    (25.0-35.0)  pg


 


MCHC    (31.0-37.0)  g/dL


 


RDW    (11.5-15.5)  %


 


Plt Count    (150-450)  k/uL


 


MPV    


 


Neutrophils %    %


 


Lymphocytes %    %


 


Monocytes %    %


 


Eosinophils %    %


 


Basophils %    %


 


Neutrophils #    (1.3-7.7)  k/uL


 


Lymphocytes #    (1.0-4.8)  k/uL


 


Monocytes #    (0-1.0)  k/uL


 


Eosinophils #    (0-0.7)  k/uL


 


Basophils #    (0-0.2)  k/uL


 


PT    (9.0-12.0)  sec


 


INR    (<1.2)  


 


APTT    (22.0-30.0)  sec


 


Sodium    (137-145)  mmol/L


 


Potassium    (3.5-5.1)  mmol/L


 


Chloride    ()  mmol/L


 


Carbon Dioxide    (22-30)  mmol/L


 


Anion Gap    mmol/L


 


BUN    (7-17)  mg/dL


 


Creatinine    (0.52-1.04)  mg/dL


 


Est GFR (CKD-EPI)AfAm    (>60 ml/min/1.73 sqM)  


 


Est GFR (CKD-EPI)NonAf    (>60 ml/min/1.73 sqM)  


 


Glucose    (74-99)  mg/dL


 


Calcium    (8.4-10.2)  mg/dL


 


Magnesium    (1.6-2.3)  mg/dL


 


Total Bilirubin    (0.2-1.3)  mg/dL


 


AST    (14-36)  U/L


 


ALT    (4-34)  U/L


 


Alkaline Phosphatase    ()  U/L


 


Troponin I  <0.012   (0.000-0.034)  ng/mL


 


NT-Pro-B Natriuret Pep   419  pg/mL


 


Total Protein    (6.3-8.2)  g/dL


 


Albumin    (3.5-5.0)  g/dL














Critical Care Time


Critical Care Time: Yes


Total Critical Care Time: 35





Disposition


Clinical Impression: 


 Atrial fibrillation with RVR, Chest pain





Disposition: ADMITTED AS IP TO THIS HOSP


Condition: Fair


Referrals: 


Nadja Ramey MD [Primary Care Provider] - 1-2 days


Time of Disposition: 08:13

## 2023-07-13 NOTE — P.HPIM
History of Present Illness


H&P Date: 07/13/23








History of present illness; patient 73-year-old lady with past medical history 

significant for atrial fibrillation who presented to the ER because of feeling 

of not feeling well.  Patient stated that she has not been compliant with 

medication recently, this morning she started having chest pain that was central

in location radiating to her left arm.  Chest pain was of shortness of breath.  

Patient is comparing of palpitations.  Denies any swelling of feet.  Because 

this chest pain and shortness of breath patient came to the ER


Initial lab work done in the ER showed WBC 7.6, hemoglobin 15.6, platelet count 

was 61, sodium 140, potassium 4.1, BUN 37, creatinine 0.8, troponin 0.012


Initial EKG done showed , , ST depressions in lead 1,V 3 V4, with T-

wave inversions in inferior and lateral leads, currently in A. fib with RVR


Chest x-ray showed no acute cardiac process





REVIEW OF SYSTEMS: 


CONSTITUTIONAL: No fever, no malaise, no fatigue. 


HEENT: No recent visual problems or hearing problems. Denied any sore throat. 


CARDIOVASCULAR: As mentioned in HPI


PULMONARY: As mentioned in HPI


GASTROINTESTINAL: No diarrhea, no nausea, no vomiting, no abdominal pain. 


NEUROLOGICAL: No headaches, no weakness, no numbness. 


HEMATOLOGICAL: Denies any bleeding or petechiae. 


GENITOURINARY: Denies any burning micturition, frequency, or urgency. 


MUSCULOSKELETAL/RHEUMATOLOGICAL: Denies any joint pain, swelling, or any muscle 

pain. 


ENDOCRINE: Denies any polyuria or polydipsia. 





The rest of the 14-point review of systems is negative.











PHYSICAL EXAMINATION: 





GENERAL: The patient is alert and oriented x3, not in any acute distress. Well 

developed, well nourished. 


HEENT: Pupils are round and equally reacting to light. EOMI. No scleral icterus.

No conjunctival pallor. Normocephalic, atraumatic. No pharyngeal erythema. No 

thyromegaly. 


CARDIOVASCULAR: S1 and S2 present. No murmurs, rubs, or gallops. 


PULMONARY: Chest is clear to auscultation, no wheezing or crackles. 


ABDOMEN: Soft, nontender, nondistended, normoactive bowel sounds. No palpable 

organomegaly. 


MUSCULOSKELETAL: No joint swelling or deformity.


EXTREMITIES: No cyanosis, clubbing, or pedal edema. 


NEUROLOGICAL: Gross neurological examination did not reveal any focal deficits. 


SKIN: No rashes. 





Assessment and plan


A. fib with RVR


Unstable angina


Hypertension





Monitor vital signs


Monitor CBC


Monitor CMP


Continue telemetry monitoring


Trend troponins


Continue Cardizem drip


Continue Xarelto


Consult cardiology


Labs and medication were reviewed..  Continue same treatment.  Continue with sym

ptomatic treatment.  Resume home medication.  Monitor labs and vitals.  DVT and 

GI prophylaxis.  Further recommendations as per clinical course of the patient








Past Medical History


Past Medical History: Atrial Fibrillation, Asthma, Cancer, Chest Pain / Angina, 

GERD/Reflux, Hyperlipidemia, Hypertension, Osteoarthritis (OA), Pneumonia


Additional Past Medical History / Comment(s): varicose veins, hx ulcer, hiatal 

hernia, constipation, degenerative disk, itchy skin, diet control diabetic, hx 

anemia, "problem with immunity", skin cancer, abnormal vaginal bleeding


History of Any Multi-Drug Resistant Organisms: None Reported


Past Surgical History: Adenoidectomy, Appendectomy, Heart Catheterization, 

Tonsillectomy


Additional Past Surgical History / Comment(s): laser eye surgeries, laparoscopy 

x 3-4, cystoscopy, D&C's, colonoscopy


Past Anesthesia/Blood Transfusion Reactions: Previous Problems w/ Anesthesia, 

Motion Sickness, Postoperative Nausea & Vomiting (PONV)


Additional Past Anesthesia/Blood Transfusion Reaction / Comment(s): "told diff 

in recovery and had to bring me back" - not sure what surgery(with general 

anesthesia). takes a little longer for anesthesia to take affect and longer to 

come out


Past Psychological History: Anxiety, Depression


Smoking Status: Never smoker


Past Alcohol Use History: None Reported, Rare


Past Drug Use History: None Reported





- Past Family History


  ** Father


Family Medical History: Cancer


Additional Family Medical History / Comment(s): grandmother had colon cancer





Medications and Allergies


                                Home Medications











 Medication  Instructions  Recorded  Confirmed  Type


 


Acyclovir 400 mg PO TID PRN 04/06/18 07/13/23 History


 


Folic Acid 1 mg PO DAILY #30 tablet 05/01/23 07/13/23 Rx


 


Latanoprost/Pf [Latanoprost 0.005% 1 drop BOTH EYES HS #5 ml 05/01/23 07/13/23 

Rx





Eye Drop]    


 


Multivitamins, Thera [Multivitamin] 1 tab PO DAILY #30 tablet 05/01/23 07/13/23 

Rx


 


Propafenone [Rythmol] 225 mg PO Q8H 30 Days #90 tab 05/01/23 07/13/23 Rx


 


Rivaroxaban [Xarelto] 20 mg PO DAILY #30 tab 05/01/23 07/13/23 Rx


 


Thiamine [Vitamin B-1] 100 mg PO DAILY #30 tablet 05/01/23 07/13/23 Rx


 


hydroCHLOROthiazide 12.5 mg PO DAILY #30 cap 05/01/23 07/13/23 Rx


 


lisinopriL [Prinivil] 10 mg PO DAILY #30 tab 05/01/23 07/13/23 Rx


 


HYDROcodone/APAP 5-325MG [Norco 1 tab PO Q6HR PRN 07/13/23 07/13/23 History





5-325]    








                                    Allergies











Allergy/AdvReac Type Severity Reaction Status Date / Time


 


azithromycin [From Zithromax] Allergy  Rash/Hives Verified 07/13/23 08:26


 


ciprofloxacin Allergy  Rash/Hives Verified 07/13/23 08:26


 


fluoxetine Allergy  Unknown Verified 07/13/23 08:26


 


Iodinated Contrast Media Allergy  Rash/Hives Verified 07/13/23 08:26


 


iodine Allergy  Rash/Hives Verified 07/13/23 08:26


 


latex Allergy  Dyspnea Verified 07/13/23 08:26


 


Latex, Natural Rubber Allergy  Dyspnea Verified 07/13/23 08:26


 


paroxetine [From Paxil] Allergy  Unknown Verified 07/13/23 08:26


 


shellfish derived [Shellfish] Allergy  Unknown Verified 07/13/23 08:26


 


selective serotonin reuptake Allergy Unknown Dyspnea Uncoded 07/13/23 06:29





inhib.     














Physical Exam


Vitals: 


                                   Vital Signs











  Temp Pulse Resp BP Pulse Ox


 


 07/13/23 07:11   116 H  18  107/53  95


 


 07/13/23 06:18  97.8 F  146 H  16  123/100  98








                                Intake and Output











 07/12/23 07/13/23 07/13/23





 22:59 06:59 14:59


 


Other:   


 


  Weight  93.894 kg 














Results


CBC & Chem 7: 


                                 07/13/23 06:30





                                 07/13/23 06:30


Labs: 


                  Abnormal Lab Results - Last 24 Hours (Table)











  07/13/23 07/13/23 Range/Units





  06:30 06:30 


 


RBC  5.59 H   (3.80-5.40)  m/uL


 


Hct  49.4 H   (34.0-46.0)  %


 


BUN   37 H  (7-17)  mg/dL


 


Glucose   128 H  (74-99)  mg/dL

## 2023-07-13 NOTE — P.CRDCN
History of Present Illness


History of present illness: 





HISTORY OF PRESENT ILLNESS:  





This is a 73-year-old female with a past medical history significant for 

hypertension and paroxysmal atrial fibrillation. Patient follows in the office 

with Dr. Mata. We have been asked to see the patient in consultation for A. 

fib with RVR. Patient examined at the bedside.  Patient was seen in the 

cardiology office yesterday for a routine follow-up visit.  Patient was doing 

well at that time.  Patient was in sinus mechanism when she saw her cardiologist

yesterday.  The patient states that she has her car packed up if she is moving 

back to [place] and could not find her medications.  She believes that she 

missed all of her cardiac medications yesterday.  Early this morning she began 

feeling palpitations and presented to the hospital for further evaluation.  The 

patient was found to be in atrial fibrillation with RVR.  At the time of 

examination she remains in atrial fibrillation with a heart rate between 100-

110.  She is currently on IV Cardizem.





* EKG reveals A. fib with RVR


* Chest xray hyperinflation may relate to depth of inspiration or underlying 

  emphysema.  No definite acute process


* Laboratory data: WBC 7.6.  Hemoglobin 15.6.  Platelet count 261.  Sodium 140. 

  Potassium 4.1.  BUN 37.  Creatinine 0.80.  Troponin negative 2.  ProBNP 419.


* Current home cardiac medications include Xarelto 20 mg daily, lisinopril 10 mg

  daily, hydrochlorothiazide 12.5 mg daily, and Rythmol 225 mg 3 times a day


* Most recent echocardiogram obtained in December 2020 revealed ejection 

  fraction 52%, mild MR, mild TR


* Patient underwent cardiac catheterization in March 2013 revealing normal 

  coronary arteries


* Patient underwent Lexiscan stress test in August 2020 which was negative for 

  ischemia





REVIEW OF SYSTEMS: 


At the time of my exam:


CONSTITUTIONAL: Denies fever or chills.


HEENT: Denies blurred vision, vision changes, or eye pain. Denies hemoptysis 


CARDIOVASCULAR: Denies chest pain. Denies orthopnea. Denies PND. Denies 

palpitations


RESPIRATORY: Denies shortness of breath. 


GASTROINTESTINAL: Denies abdominal pain. Denies nausea or vomiting. 


HEMATOLOGIC: Denies bleeding disorders.


GENITOURINARY:  Denies any blood in urine.


SKIN: Denies pruitis. Denies rash.





PHYSICAL EXAM: 


VITAL SIGNS: Reviewed.


GENERAL: Well-developed in no acute distress. 


HEENT: Head is normocephalic. Pupils are equal, round. Sclerae anicteric. Mucous

membranes of the mouth are moist. Neck supple. No JVD or thyromegaly


LUNGS: Respirations even and unlabored. Lungs essentially clear to auscultation 

bilaterally.


HEART: Tachycardic.  Irregular rate and rhythm.  S1 and S2 heard.  Systolic 

murmur noted


ABDOMEN: Soft. Nondistended. Nontender.


EXTREMITIES: Normal range of motion.  No clubbing or cyanosis.  Peripheral 

pulses intact.  No lower extremity edema


NEUROLOGIC: Awake and alert. Oriented x 3. 





ASSESSMENT: 


Palpitations


Paroxysmal atrial fibrillation with RVR


Hypertension


Normal coronary arteries, per cardiac catheterization in 2013


Osteoarthritis





PLAN: 


Resume home cardiac medications


Add metoprolol tartrate 25 mg twice a day


Wean off Cardizem drip as heart rate will tolerate


Further recommendations pending patient's course








Nurse practitioner note has been reviewed by physician. Signing provider agrees 

with the documented findings, assessment, and plan of care. 








Past Medical History


Past Medical History: Atrial Fibrillation, Asthma, Cancer, Chest Pain / Angina, 

GERD/Reflux, Hyperlipidemia, Hypertension, Osteoarthritis (OA), Pneumonia


Additional Past Medical History / Comment(s): varicose veins, hx ulcer, hiatal 

hernia, constipation, degenerative disk, itchy skin, diet control diabetic, hx 

anemia, "problem with immunity", skin cancer, abnormal vaginal bleeding


History of Any Multi-Drug Resistant Organisms: None Reported


Past Surgical History: Adenoidectomy, Appendectomy, Heart Catheterization, 

Tonsillectomy


Additional Past Surgical History / Comment(s): laser eye surgeries, laparoscopy 

x 3-4, cystoscopy, D&C's, colonoscopy


Past Anesthesia/Blood Transfusion Reactions: Previous Problems w/ Anesthesia, 

Motion Sickness, Postoperative Nausea & Vomiting (PONV)


Additional Past Anesthesia/Blood Transfusion Reaction / Comment(s): "told diff 

in recovery and had to bring me back" - not sure what surgery(with general 

anesthesia). takes a little longer for anesthesia to take affect and longer to 

come out


Past Psychological History: Anxiety, Depression


Smoking Status: Never smoker


Past Alcohol Use History: None Reported, Rare


Past Drug Use History: None Reported





- Past Family History


  ** Father


Family Medical History: Cancer


Additional Family Medical History / Comment(s): grandmother had colon cancer





Medications and Allergies


                                Home Medications











 Medication  Instructions  Recorded  Confirmed  Type


 


Acyclovir 400 mg PO TID PRN 04/06/18 07/13/23 History


 


Folic Acid 1 mg PO DAILY #30 tablet 05/01/23 07/13/23 Rx


 


Latanoprost/Pf [Latanoprost 0.005% 1 drop BOTH EYES HS #5 ml 05/01/23 07/13/23 

Rx





Eye Drop]    


 


Multivitamins, Thera [Multivitamin] 1 tab PO DAILY #30 tablet 05/01/23 07/13/23 

Rx


 


Propafenone [Rythmol] 225 mg PO Q8H 30 Days #90 tab 05/01/23 07/13/23 Rx


 


Rivaroxaban [Xarelto] 20 mg PO DAILY #30 tab 05/01/23 07/13/23 Rx


 


Thiamine [Vitamin B-1] 100 mg PO DAILY #30 tablet 05/01/23 07/13/23 Rx


 


hydroCHLOROthiazide 12.5 mg PO DAILY #30 cap 05/01/23 07/13/23 Rx


 


lisinopriL [Prinivil] 10 mg PO DAILY #30 tab 05/01/23 07/13/23 Rx


 


HYDROcodone/APAP 5-325MG [Norco 1 tab PO Q6HR PRN 07/13/23 07/13/23 History





5-325]    








                                    Allergies











Allergy/AdvReac Type Severity Reaction Status Date / Time


 


azithromycin [From Zithromax] Allergy  Rash/Hives Verified 07/13/23 08:26


 


ciprofloxacin Allergy  Rash/Hives Verified 07/13/23 08:26


 


fluoxetine Allergy  Unknown Verified 07/13/23 08:26


 


Iodinated Contrast Media Allergy  Rash/Hives Verified 07/13/23 08:26


 


iodine Allergy  Rash/Hives Verified 07/13/23 08:26


 


latex Allergy  Dyspnea Verified 07/13/23 08:26


 


Latex, Natural Rubber Allergy  Dyspnea Verified 07/13/23 08:26


 


paroxetine [From Paxil] Allergy  Unknown Verified 07/13/23 08:26


 


shellfish derived [Shellfish] Allergy  Unknown Verified 07/13/23 08:26


 


selective serotonin reuptake Allergy Unknown Dyspnea Uncoded 07/13/23 06:29





inhib.     














Physical Exam


Vitals: 


                                   Vital Signs











  Temp Pulse Resp BP Pulse Ox


 


 07/13/23 07:11   116 H  18  107/53  95


 


 07/13/23 06:18  97.8 F  146 H  16  123/100  98








                                Intake and Output











 07/12/23 07/13/23 07/13/23





 22:59 06:59 14:59


 


Other:   


 


  Weight  93.894 kg 














Results





                                 07/13/23 06:30





                                 07/13/23 06:30


                                 Cardiac Enzymes











  07/13/23 07/13/23 Range/Units





  06:30 06:30 


 


AST  25   (14-36)  U/L


 


Troponin I   <0.012  (0.000-0.034)  ng/mL








                                   Coagulation











  07/13/23 Range/Units





  06:30 


 


PT  10.5  (9.0-12.0)  sec


 


APTT  25.8  (22.0-30.0)  sec








                                       CBC











  07/13/23 Range/Units





  06:30 


 


WBC  7.6  (3.8-10.6)  k/uL


 


RBC  5.59 H  (3.80-5.40)  m/uL


 


Hgb  15.6  (11.4-16.0)  gm/dL


 


Hct  49.4 H  (34.0-46.0)  %


 


Plt Count  261  (150-450)  k/uL








                          Comprehensive Metabolic Panel











  07/13/23 Range/Units





  06:30 


 


Sodium  140  (137-145)  mmol/L


 


Potassium  4.1  (3.5-5.1)  mmol/L


 


Chloride  105  ()  mmol/L


 


Carbon Dioxide  27  (22-30)  mmol/L


 


BUN  37 H  (7-17)  mg/dL


 


Creatinine  0.80  (0.52-1.04)  mg/dL


 


Glucose  128 H  (74-99)  mg/dL


 


Calcium  10.0  (8.4-10.2)  mg/dL


 


AST  25  (14-36)  U/L


 


ALT  21  (4-34)  U/L


 


Alkaline Phosphatase  97  ()  U/L


 


Total Protein  7.2  (6.3-8.2)  g/dL


 


Albumin  4.2  (3.5-5.0)  g/dL








                               Current Medications











Generic Name Dose Route Start Last Admin





  Trade Name Freq  PRN Reason Stop Dose Admin


 


Diltiazem HCl 125 mg/ Sodium  125 mls @ 5 mls/hr  07/13/23 07:00  07/13/23 07:09





  Chloride  IV   5 mg/hr





  .Q24H LENIN   5 mls/hr





    Administration





  5 MG/HR  


 


Nitroglycerin  0.4 mg  07/13/23 08:27 





  Nitroglycerin Sl Tabs 0.4 Mg Tab  SUBLINGUAL  





  Q5M PRN  





  Chest Pain  








                                Intake and Output











 07/12/23 07/13/23 07/13/23





 22:59 06:59 14:59


 


Other:   


 


  Weight  93.894 kg 








                                        





                                 07/13/23 06:30 





                                 07/13/23 06:30

## 2023-07-13 NOTE — XR
EXAMINATION TYPE: XR chest 1V portable

 

DATE OF EXAM: 7/13/2023

 

Comparison: 4/28/2023

 

Clinical History: 73-year-old female Chest Pain

 

Findings:

Heart is upper limits of normal in size. Aorta and pulmonary vasculature within normal limits. Mild h
yperinflation. Hazy densities in the lower lungs relating to overlying soft tissue density. No defini
te consolidation or pleural effusion.

 

 

Impression:

Hyperinflation may relate to depth of inspiration or underlying emphysema. No definite acute process.

## 2023-07-14 VITALS — HEART RATE: 55 BPM | SYSTOLIC BLOOD PRESSURE: 94 MMHG | DIASTOLIC BLOOD PRESSURE: 57 MMHG | TEMPERATURE: 98 F

## 2023-07-14 LAB
CHOLEST SERPL-MCNC: 179 MG/DL (ref 0–200)
HDLC SERPL-MCNC: 47.2 MG/DL (ref 40–60)
LDLC SERPL CALC-MCNC: 86.4 MG/DL (ref 0–131)
TRIGL SERPL-MCNC: 227 MG/DL (ref 0–149)
VLDLC SERPL CALC-MCNC: 45.4 MG/DL (ref 5–40)

## 2023-07-14 RX ADMIN — ACETAMINOPHEN PRN MG: 325 TABLET, FILM COATED ORAL at 08:40

## 2023-07-14 RX ADMIN — PROPAFENONE HYDROCHLORIDE SCH MG: 225 TABLET, FILM COATED ORAL at 08:41

## 2023-07-14 RX ADMIN — DILTIAZEM HYDROCHLORIDE SCH: 5 INJECTION INTRAVENOUS at 08:24

## 2023-07-14 RX ADMIN — RIVAROXABAN SCH MG: 20 TABLET, FILM COATED ORAL at 08:40

## 2023-07-14 RX ADMIN — METOPROLOL TARTRATE SCH MG: 25 TABLET, FILM COATED ORAL at 08:41

## 2023-07-14 NOTE — P.DS
Providers


Date of admission: 


07/13/23 08:24





Expected date of discharge: 07/14/23


Attending physician: 


Naga Vega





Consults: 





                                        





07/13/23 08:27


Consult Physician Urgent 


   Consulting Provider: Mukul Mata


   Consult Reason/Comments: chest pain, afib rvr


   Do you want consulting provider notified?: Yes











Primary care physician: 


Nadja Karoline





[facility] Course: 





Discharge diagnoses;


A. fib with RVR


Unstable angina


Hypertension








Hospital course;


patient 73-year-old lady with past medical history significant for atrial 

fibrillation who presented to the ER because of feeling of not feeling well.  

Patient stated that she has not been compliant with medication recently, this 

morning she started having chest pain that was central in location radiating to 

her left arm.  Chest pain was of shortness of breath.  Patient is comparing of 

palpitations.  Denies any swelling of feet.  Because this chest pain and 

shortness of breath patient came to the ER


Initial lab work done in the ER showed WBC 7.6, hemoglobin 15.6, platelet count 

was 61, sodium 140, potassium 4.1, BUN 37, creatinine 0.8, troponin 0.012


Initial EKG done showed  currently in A. fib with RVR


Chest x-ray showed no acute cardiac process





7/14.  Patient seen and examined.  States she feels much better.  Cardiology 

started patient on Lopressor, discontinue lisinopril.  Cardiology cleared the 

patient for discharge





PHYSICAL EXAMINATION: 





GENERAL: The patient is alert and oriented x3, not in any acute distress. Well 

developed, well nourished. 


HEENT: Pupils are round and equally reacting to light. EOMI. No scleral icterus.

No conjunctival pallor. Normocephalic, atraumatic. No pharyngeal erythema. No 

thyromegaly. 


CARDIOVASCULAR: S1 and S2 present. No murmurs, rubs, or gallops. 


PULMONARY: Chest is clear to auscultation, no wheezing or crackles. 


ABDOMEN: Soft, nontender, nondistended, normoactive bowel sounds. No palpable 

organomegaly. 


MUSCULOSKELETAL: No joint swelling or deformity.


EXTREMITIES: No cyanosis, clubbing, or pedal edema. 


NEUROLOGICAL: Gross neurological examination did not reveal any focal deficits. 


SKIN: No rashes. 





Patient Condition at Discharge: Fair





Plan - Discharge Summary


Discharge Rx Participant: No


New Discharge Prescriptions: 


New


   Metoprolol Tartrate [Lopressor] 25 mg PO BID #60 tablet





Continue


   Acyclovir 400 mg PO TID PRN


     PRN Reason: Skin Irritation


   Multivitamins, Thera [Multivitamin (formulary)] 1 tab PO DAILY #30 tablet


   Propafenone [Rythmol] 225 mg PO Q8H 30 Days #90 tab


   Rivaroxaban [Xarelto] 20 mg PO DAILY #30 tab


   HYDROcodone/APAP 5-325MG [Norco 5-325] 1 tab PO Q6HR PRN


     PRN Reason: Pain


   Folic Acid 1 mg PO DAILY #30 tablet


   Thiamine [Vitamin B-1] 100 mg PO DAILY #30 tablet


   hydroCHLOROthiazide 12.5 mg PO DAILY #30 cap


   Latanoprost/Pf [Latanoprost 0.005% Eye Drop] 1 drop BOTH EYES HS #5 ml





Discontinued


   lisinopriL [Prinivil] 10 mg PO DAILY #30 tab


Discharge Medication List





Acyclovir 400 mg PO TID PRN 04/06/18 [History]


Folic Acid 1 mg PO DAILY #30 tablet 05/01/23 [Rx]


Latanoprost/Pf [Latanoprost 0.005% Eye Drop] 1 drop BOTH EYES HS #5 ml 05/01/23 

[Rx]


Multivitamins, Thera [Multivitamin (formulary)] 1 tab PO DAILY #30 tablet 

05/01/23 [Rx]


Propafenone [Rythmol] 225 mg PO Q8H 30 Days #90 tab 05/01/23 [Rx]


Rivaroxaban [Xarelto] 20 mg PO DAILY #30 tab 05/01/23 [Rx]


Thiamine [Vitamin B-1] 100 mg PO DAILY #30 tablet 05/01/23 [Rx]


hydroCHLOROthiazide 12.5 mg PO DAILY #30 cap 05/01/23 [Rx]


HYDROcodone/APAP 5-325MG [Norco 5-325] 1 tab PO Q6HR PRN 07/13/23 [History]


Metoprolol Tartrate [Lopressor] 25 mg PO BID #60 tablet 07/14/23 [Rx]








Follow up Appointment(s)/Referral(s): 


Mukul Mata MD [STAFF PHYSICIAN] - 2 Weeks


Nadja Ramey MD [Primary Care Provider] - 1-2 days

## 2023-07-14 NOTE — P.PN
Subjective





HISTORY OF PRESENT ILLNESS:  





This is a 73-year-old female with a past medical history significant for 

hypertension and paroxysmal atrial fibrillation. Patient follows in the office 

with Dr. Mata. We have been asked to see the patient in consultation for A. 

fib with RVR. Patient examined at the bedside.  Patient was seen in the 

cardiology office yesterday for a routine follow-up visit.  Patient was doing 

well at that time.  Patient was in sinus mechanism when she saw her cardiologist

yesterday.  The patient states that she has her car packed up if she is moving 

back to [place] and could not find her medications.  She believes that she 

missed all of her cardiac medications yesterday.  Early this morning she began 

feeling palpitations and presented to the hospital for further evaluation.  The 

patient was found to be in atrial fibrillation with RVR.  At the time of 

examination she remains in atrial fibrillation with a heart rate between 100-

110.  She is currently on IV Cardizem.





* EKG reveals A. fib with RVR


* Chest xray hyperinflation may relate to depth of inspiration or underlying 

  emphysema.  No definite acute process


* Laboratory data: WBC 7.6.  Hemoglobin 15.6.  Platelet count 261.  Sodium 140. 

  Potassium 4.1.  BUN 37.  Creatinine 0.80.  Troponin negative 2.  ProBNP 419.


* Current home cardiac medications include Xarelto 20 mg daily, lisinopril 10 mg

  daily, hydrochlorothiazide 12.5 mg daily, and Rythmol 225 mg 3 times a day


* Most recent echocardiogram obtained in December 2020 revealed ejection 

  fraction 52%, mild MR, mild TR


* Patient underwent cardiac catheterization in March 2013 revealing normal 

  coronary arteries


* Patient underwent Lexiscan stress test in August 2020 which was negative for 

  ischemia





7/14/2023


Patient examined this morning at the bedside.  Patient denies chest pain or 

pressure.  She denies shortness of breath.  Patient is maintaining sinus 

mechanism this morning with heart rate in the 50s to 60s.  Patient's blood 

pressure running a lower side with a systolic between 8090.





PHYSICAL EXAM: 


VITAL SIGNS: Reviewed.


GENERAL: Well-developed in no acute distress. 


HEENT: Head is normocephalic. Pupils are equal, round. Sclerae anicteric. Mucous

membranes of the mouth are moist. Neck supple. No JVD or thyromegaly


LUNGS: Respirations even and unlabored. Lungs essentially clear to auscultation 

bilaterally.


HEART: Regular rate and rhythm.  S1 and S2 heard.  Systolic murmur noted


ABDOMEN: Soft. Nondistended. Nontender.


EXTREMITIES: Normal range of motion.  No clubbing or cyanosis.  Peripheral 

pulses intact.  No lower extremity edema


NEUROLOGIC: Awake and alert. Oriented x 3. 





ASSESSMENT: 


Palpitations


Paroxysmal atrial fibrillation with RVR, currently maintaining sinus mechanism


Hypertension, currently borderline hypotensive


Normal coronary arteries, per cardiac catheterization in 2013


Osteoarthritis





PLAN: 


Continue oral anticoagulation with Xarelto


Continue current dose of metoprolol and Rythmol


Discontinue lisinopril secondary to hypotension


Patient instructed to monitor her blood pressure on an outpatient basis and keep

a log to bring with her to her follow-up appointment


Patient may be discharged home this afternoon from a cardiac standpoint








Nurse practitioner note has been reviewed by physician. Signing provider agrees 

with the documented findings, assessment, and plan of care. 








Objective





- Vital Signs


Vital signs: 


                                   Vital Signs











Temp  98.1 F   07/14/23 08:24


 


Pulse  58 L  07/14/23 08:24


 


Resp  18   07/14/23 08:24


 


BP  94/51   07/14/23 08:24


 


Pulse Ox  97   07/14/23 08:24


 


FiO2      








                                 Intake & Output











 07/13/23 07/14/23 07/14/23





 18:59 06:59 18:59


 


Intake Total 139.667 540 118


 


Balance 139.667 540 118


 


Weight 93.894 kg  


 


Intake:   


 


  Intake, IV Titration 29.667  





  Amount   


 


    Diltiazem 125 mg In 29.667  





    Sodium Chloride 0.9% 100   





    ml @ 5 MG/HR 5 mls/hr IV   





    .Q24H [facility] Rx#:[id number]   


 


  Oral 110 540 118


 


Other:   


 


  Voiding Method Toilet Toilet Toilet


 


  # Voids 2 1 1














- Labs


CBC & Chem 7: 


                                 07/13/23 06:30





                                 07/13/23 06:30


Labs: 


                  Abnormal Lab Results - Last 24 Hours (Table)











  07/13/23 Range/Units





  06:30 


 


Triglycerides  227.00 H  (0..00)  mg/dL


 


VLDL Cholesterol, Calc  45.40 H  (5.00-40.00)  mg/dL

## 2024-06-05 NOTE — XR
EXAM:

  XR Pelvis, 1 or 2 Views

 

CLINICAL HISTORY:

  ITS.REASON XR Reason: Trauma

 

TECHNIQUE:

  Frontal view of the pelvis.

 

COMPARISON:

  No relevant prior studies available.

 

FINDINGS:

  Bones/joints:  Lumbar spondylosis.  Osseous demineralization.  No 

dislocation.  No pelvic fracture.

  Soft tissues:  Unremarkable.

 

IMPRESSION:     

  No pelvic fracture. VIEW ALL